# Patient Record
Sex: MALE | Race: ASIAN | NOT HISPANIC OR LATINO | URBAN - METROPOLITAN AREA
[De-identification: names, ages, dates, MRNs, and addresses within clinical notes are randomized per-mention and may not be internally consistent; named-entity substitution may affect disease eponyms.]

---
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
## 2018-02-22 NOTE — ED PROVIDER NOTE - CONSTITUTIONAL, MLM
Detail Level: Zone Detail Level: Generalized normal... Well appearing, well nourished, awake, alert, oriented to person, place, time/situation and in no apparent distress.

## 2022-04-02 ENCOUNTER — INPATIENT (INPATIENT)
Facility: HOSPITAL | Age: 22
LOS: 1 days | Discharge: HOME | DRG: 101 | End: 2022-04-04
Attending: STUDENT IN AN ORGANIZED HEALTH CARE EDUCATION/TRAINING PROGRAM | Admitting: STUDENT IN AN ORGANIZED HEALTH CARE EDUCATION/TRAINING PROGRAM
Payer: COMMERCIAL

## 2022-04-02 VITALS
DIASTOLIC BLOOD PRESSURE: 76 MMHG | HEIGHT: 66 IN | SYSTOLIC BLOOD PRESSURE: 157 MMHG | RESPIRATION RATE: 16 BRPM | OXYGEN SATURATION: 100 % | HEART RATE: 66 BPM | WEIGHT: 200.62 LBS | TEMPERATURE: 98 F

## 2022-04-02 DIAGNOSIS — R56.9 UNSPECIFIED CONVULSIONS: ICD-10-CM

## 2022-04-02 DIAGNOSIS — K29.70 GASTRITIS, UNSPECIFIED, WITHOUT BLEEDING: ICD-10-CM

## 2022-04-02 DIAGNOSIS — Z29.9 ENCOUNTER FOR PROPHYLACTIC MEASURES, UNSPECIFIED: ICD-10-CM

## 2022-04-02 DIAGNOSIS — N17.9 ACUTE KIDNEY FAILURE, UNSPECIFIED: ICD-10-CM

## 2022-04-02 LAB
ALBUMIN SERPL ELPH-MCNC: 4.8 G/DL — SIGNIFICANT CHANGE UP (ref 3.5–5)
ALP SERPL-CCNC: 80 U/L — SIGNIFICANT CHANGE UP (ref 40–120)
ALT FLD-CCNC: 30 U/L DA — SIGNIFICANT CHANGE UP (ref 10–60)
AMPHET UR-MCNC: NEGATIVE — SIGNIFICANT CHANGE UP
ANION GAP SERPL CALC-SCNC: 8 MMOL/L — SIGNIFICANT CHANGE UP (ref 5–17)
APTT BLD: 29.4 SEC — SIGNIFICANT CHANGE UP (ref 27.5–35.5)
AST SERPL-CCNC: 33 U/L — SIGNIFICANT CHANGE UP (ref 10–40)
BARBITURATES UR SCN-MCNC: NEGATIVE — SIGNIFICANT CHANGE UP
BASOPHILS # BLD AUTO: 0.04 K/UL — SIGNIFICANT CHANGE UP (ref 0–0.2)
BASOPHILS NFR BLD AUTO: 0.2 % — SIGNIFICANT CHANGE UP (ref 0–2)
BENZODIAZ UR-MCNC: POSITIVE
BILIRUB SERPL-MCNC: 0.6 MG/DL — SIGNIFICANT CHANGE UP (ref 0.2–1.2)
BUN SERPL-MCNC: 16 MG/DL — SIGNIFICANT CHANGE UP (ref 7–18)
CALCIUM SERPL-MCNC: 9.8 MG/DL — SIGNIFICANT CHANGE UP (ref 8.4–10.5)
CHLORIDE SERPL-SCNC: 106 MMOL/L — SIGNIFICANT CHANGE UP (ref 96–108)
CO2 SERPL-SCNC: 25 MMOL/L — SIGNIFICANT CHANGE UP (ref 22–31)
COCAINE METAB.OTHER UR-MCNC: NEGATIVE — SIGNIFICANT CHANGE UP
CREAT ?TM UR-MCNC: 140 MG/DL — SIGNIFICANT CHANGE UP
CREAT SERPL-MCNC: 1.4 MG/DL — HIGH (ref 0.5–1.3)
EGFR: 72 ML/MIN/1.73M2 — SIGNIFICANT CHANGE UP
EOSINOPHIL # BLD AUTO: 0.01 K/UL — SIGNIFICANT CHANGE UP (ref 0–0.5)
EOSINOPHIL NFR BLD AUTO: 0.1 % — SIGNIFICANT CHANGE UP (ref 0–6)
ETHANOL SERPL-MCNC: <3 MG/DL — SIGNIFICANT CHANGE UP (ref 0–10)
FLUAV AG NPH QL: SIGNIFICANT CHANGE UP
FLUBV AG NPH QL: SIGNIFICANT CHANGE UP
GLUCOSE SERPL-MCNC: 132 MG/DL — HIGH (ref 70–99)
HCT VFR BLD CALC: 41.6 % — SIGNIFICANT CHANGE UP (ref 39–50)
HGB BLD-MCNC: 14.2 G/DL — SIGNIFICANT CHANGE UP (ref 13–17)
IMM GRANULOCYTES NFR BLD AUTO: 0.4 % — SIGNIFICANT CHANGE UP (ref 0–1.5)
INR BLD: 1.01 RATIO — SIGNIFICANT CHANGE UP (ref 0.88–1.16)
LACTATE SERPL-SCNC: 1 MMOL/L — SIGNIFICANT CHANGE UP (ref 0.7–2)
LIDOCAIN IGE QN: 65 U/L — LOW (ref 73–393)
LYMPHOCYTES # BLD AUTO: 1.43 K/UL — SIGNIFICANT CHANGE UP (ref 1–3.3)
LYMPHOCYTES # BLD AUTO: 8.2 % — LOW (ref 13–44)
MCHC RBC-ENTMCNC: 28.7 PG — SIGNIFICANT CHANGE UP (ref 27–34)
MCHC RBC-ENTMCNC: 34.1 GM/DL — SIGNIFICANT CHANGE UP (ref 32–36)
MCV RBC AUTO: 84.2 FL — SIGNIFICANT CHANGE UP (ref 80–100)
METHADONE UR-MCNC: NEGATIVE — SIGNIFICANT CHANGE UP
MONOCYTES # BLD AUTO: 0.75 K/UL — SIGNIFICANT CHANGE UP (ref 0–0.9)
MONOCYTES NFR BLD AUTO: 4.3 % — SIGNIFICANT CHANGE UP (ref 2–14)
NEUTROPHILS # BLD AUTO: 15.15 K/UL — HIGH (ref 1.8–7.4)
NEUTROPHILS NFR BLD AUTO: 86.8 % — HIGH (ref 43–77)
NRBC # BLD: 0 /100 WBCS — SIGNIFICANT CHANGE UP (ref 0–0)
OPIATES UR-MCNC: NEGATIVE — SIGNIFICANT CHANGE UP
OSMOLALITY UR: 515 MOS/KG — SIGNIFICANT CHANGE UP (ref 50–1200)
PCP SPEC-MCNC: SIGNIFICANT CHANGE UP
PCP UR-MCNC: NEGATIVE — SIGNIFICANT CHANGE UP
PLATELET # BLD AUTO: 287 K/UL — SIGNIFICANT CHANGE UP (ref 150–400)
POTASSIUM SERPL-MCNC: 4.4 MMOL/L — SIGNIFICANT CHANGE UP (ref 3.5–5.3)
POTASSIUM SERPL-SCNC: 4.4 MMOL/L — SIGNIFICANT CHANGE UP (ref 3.5–5.3)
PROT SERPL-MCNC: 7.9 G/DL — SIGNIFICANT CHANGE UP (ref 6–8.3)
PROTHROM AB SERPL-ACNC: 12 SEC — SIGNIFICANT CHANGE UP (ref 10.5–13.4)
RBC # BLD: 4.94 M/UL — SIGNIFICANT CHANGE UP (ref 4.2–5.8)
RBC # FLD: 12.4 % — SIGNIFICANT CHANGE UP (ref 10.3–14.5)
SARS-COV-2 RNA SPEC QL NAA+PROBE: SIGNIFICANT CHANGE UP
SARS-COV-2 RNA SPEC QL NAA+PROBE: SIGNIFICANT CHANGE UP
SODIUM SERPL-SCNC: 139 MMOL/L — SIGNIFICANT CHANGE UP (ref 135–145)
SODIUM UR-SCNC: 33 MMOL/L — SIGNIFICANT CHANGE UP
THC UR QL: POSITIVE
WBC # BLD: 17.45 K/UL — HIGH (ref 3.8–10.5)
WBC # FLD AUTO: 17.45 K/UL — HIGH (ref 3.8–10.5)

## 2022-04-02 PROCEDURE — 99223 1ST HOSP IP/OBS HIGH 75: CPT | Mod: GC

## 2022-04-02 PROCEDURE — 99285 EMERGENCY DEPT VISIT HI MDM: CPT

## 2022-04-02 PROCEDURE — 70450 CT HEAD/BRAIN W/O DYE: CPT | Mod: 26,MA

## 2022-04-02 PROCEDURE — 99223 1ST HOSP IP/OBS HIGH 75: CPT

## 2022-04-02 PROCEDURE — 71045 X-RAY EXAM CHEST 1 VIEW: CPT | Mod: 26

## 2022-04-02 RX ORDER — ESCITALOPRAM OXALATE 10 MG/1
10 TABLET, FILM COATED ORAL DAILY
Refills: 0 | Status: DISCONTINUED | OUTPATIENT
Start: 2022-04-02 | End: 2022-04-04

## 2022-04-02 RX ORDER — ENOXAPARIN SODIUM 100 MG/ML
40 INJECTION SUBCUTANEOUS EVERY 24 HOURS
Refills: 0 | Status: DISCONTINUED | OUTPATIENT
Start: 2022-04-02 | End: 2022-04-04

## 2022-04-02 RX ORDER — SODIUM CHLORIDE 9 MG/ML
1000 INJECTION INTRAMUSCULAR; INTRAVENOUS; SUBCUTANEOUS ONCE
Refills: 0 | Status: COMPLETED | OUTPATIENT
Start: 2022-04-02 | End: 2022-04-02

## 2022-04-02 RX ORDER — ONDANSETRON 8 MG/1
4 TABLET, FILM COATED ORAL EVERY 8 HOURS
Refills: 0 | Status: DISCONTINUED | OUTPATIENT
Start: 2022-04-02 | End: 2022-04-04

## 2022-04-02 RX ORDER — ONDANSETRON 8 MG/1
4 TABLET, FILM COATED ORAL ONCE
Refills: 0 | Status: COMPLETED | OUTPATIENT
Start: 2022-04-02 | End: 2022-04-02

## 2022-04-02 RX ORDER — SODIUM CHLORIDE 9 MG/ML
3 INJECTION INTRAMUSCULAR; INTRAVENOUS; SUBCUTANEOUS EVERY 8 HOURS
Refills: 0 | Status: DISCONTINUED | OUTPATIENT
Start: 2022-04-02 | End: 2022-04-04

## 2022-04-02 RX ORDER — LANOLIN ALCOHOL/MO/W.PET/CERES
3 CREAM (GRAM) TOPICAL AT BEDTIME
Refills: 0 | Status: DISCONTINUED | OUTPATIENT
Start: 2022-04-02 | End: 2022-04-04

## 2022-04-02 RX ORDER — ACETAMINOPHEN 500 MG
650 TABLET ORAL EVERY 6 HOURS
Refills: 0 | Status: DISCONTINUED | OUTPATIENT
Start: 2022-04-02 | End: 2022-04-04

## 2022-04-02 RX ORDER — PANTOPRAZOLE SODIUM 20 MG/1
40 TABLET, DELAYED RELEASE ORAL
Refills: 0 | Status: DISCONTINUED | OUTPATIENT
Start: 2022-04-02 | End: 2022-04-03

## 2022-04-02 RX ORDER — LEVETIRACETAM 250 MG/1
500 TABLET, FILM COATED ORAL EVERY 12 HOURS
Refills: 0 | Status: DISCONTINUED | OUTPATIENT
Start: 2022-04-02 | End: 2022-04-04

## 2022-04-02 RX ORDER — ACETAMINOPHEN 500 MG
1000 TABLET ORAL ONCE
Refills: 0 | Status: COMPLETED | OUTPATIENT
Start: 2022-04-02 | End: 2022-04-02

## 2022-04-02 RX ORDER — LEVETIRACETAM 250 MG/1
1000 TABLET, FILM COATED ORAL ONCE
Refills: 0 | Status: COMPLETED | OUTPATIENT
Start: 2022-04-02 | End: 2022-04-02

## 2022-04-02 RX ORDER — SODIUM CHLORIDE 9 MG/ML
1000 INJECTION INTRAMUSCULAR; INTRAVENOUS; SUBCUTANEOUS
Refills: 0 | Status: DISCONTINUED | OUTPATIENT
Start: 2022-04-02 | End: 2022-04-03

## 2022-04-02 RX ORDER — METOCLOPRAMIDE HCL 10 MG
10 TABLET ORAL ONCE
Refills: 0 | Status: COMPLETED | OUTPATIENT
Start: 2022-04-02 | End: 2022-04-02

## 2022-04-02 RX ADMIN — Medication 30 MILLILITER(S): at 12:00

## 2022-04-02 RX ADMIN — ENOXAPARIN SODIUM 40 MILLIGRAM(S): 100 INJECTION SUBCUTANEOUS at 12:00

## 2022-04-02 RX ADMIN — Medication 1000 MILLIGRAM(S): at 01:47

## 2022-04-02 RX ADMIN — LEVETIRACETAM 400 MILLIGRAM(S): 250 TABLET, FILM COATED ORAL at 02:27

## 2022-04-02 RX ADMIN — Medication 10 MILLIGRAM(S): at 02:28

## 2022-04-02 RX ADMIN — SODIUM CHLORIDE 70 MILLILITER(S): 9 INJECTION INTRAMUSCULAR; INTRAVENOUS; SUBCUTANEOUS at 22:56

## 2022-04-02 RX ADMIN — PANTOPRAZOLE SODIUM 40 MILLIGRAM(S): 20 TABLET, DELAYED RELEASE ORAL at 10:09

## 2022-04-02 RX ADMIN — SODIUM CHLORIDE 1000 MILLILITER(S): 9 INJECTION INTRAMUSCULAR; INTRAVENOUS; SUBCUTANEOUS at 01:09

## 2022-04-02 RX ADMIN — Medication 1000 MILLIGRAM(S): at 01:46

## 2022-04-02 RX ADMIN — ONDANSETRON 4 MILLIGRAM(S): 8 TABLET, FILM COATED ORAL at 20:49

## 2022-04-02 RX ADMIN — LEVETIRACETAM 1000 MILLIGRAM(S): 250 TABLET, FILM COATED ORAL at 02:47

## 2022-04-02 RX ADMIN — SODIUM CHLORIDE 1000 MILLILITER(S): 9 INJECTION INTRAMUSCULAR; INTRAVENOUS; SUBCUTANEOUS at 01:47

## 2022-04-02 RX ADMIN — ONDANSETRON 4 MILLIGRAM(S): 8 TABLET, FILM COATED ORAL at 01:09

## 2022-04-02 RX ADMIN — SODIUM CHLORIDE 3 MILLILITER(S): 9 INJECTION INTRAMUSCULAR; INTRAVENOUS; SUBCUTANEOUS at 05:44

## 2022-04-02 RX ADMIN — Medication 400 MILLIGRAM(S): at 01:09

## 2022-04-02 RX ADMIN — SODIUM CHLORIDE 70 MILLILITER(S): 9 INJECTION INTRAMUSCULAR; INTRAVENOUS; SUBCUTANEOUS at 10:09

## 2022-04-02 RX ADMIN — LEVETIRACETAM 420 MILLIGRAM(S): 250 TABLET, FILM COATED ORAL at 19:03

## 2022-04-02 RX ADMIN — SODIUM CHLORIDE 3 MILLILITER(S): 9 INJECTION INTRAMUSCULAR; INTRAVENOUS; SUBCUTANEOUS at 21:51

## 2022-04-02 RX ADMIN — SODIUM CHLORIDE 3 MILLILITER(S): 9 INJECTION INTRAMUSCULAR; INTRAVENOUS; SUBCUTANEOUS at 13:29

## 2022-04-02 RX ADMIN — ONDANSETRON 4 MILLIGRAM(S): 8 TABLET, FILM COATED ORAL at 12:00

## 2022-04-02 RX ADMIN — ESCITALOPRAM OXALATE 10 MILLIGRAM(S): 10 TABLET, FILM COATED ORAL at 19:03

## 2022-04-02 NOTE — H&P ADULT - PROBLEM SELECTOR PLAN 2
- Patient presented with  elevated creatinine level 1.4 , baseline unknown   - IV hydration   - F/U urine electrolytes

## 2022-04-02 NOTE — ED PROVIDER NOTE - CHPI ED SYMPTOMS NEG
no headache, neck stiffness, diarrhea, abdominal pain, chest pain, shortness of breath, focal numbness or weakness/no fever

## 2022-04-02 NOTE — CONSULT NOTE ADULT - SUBJECTIVE AND OBJECTIVE BOX
GLORIA Turner is a 22y old  Male who presents with a chief complaint of     HPI:  22 year old male with PMHx of gastritis and  previous 2 seizure episodes  associated with Benzodiazepine withdrawal approximately 7 years ago presents to the ED at [facility] with nausea and vomiting following  a seizure that happened yesterday witnessed by his brother. 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 He then felt nauseated and fell having convulsions.  He was told by his brother that he saw him having seizure. Mother described the event as generalized shakiness of the body that lasted for 5 min then he lost consciousness so she called the ambulance.  He reports that he bite his tongue and hit his head during the event.  Patient then went to [facility] where labs and CT were performed which was reportedly normal, and was then discharged on oral keppra. Patient states that he has been vomiting all day after coming for [place] and has been unable to take his Keppra, prompting this ER visit.  He currently only feels nauseous and tired with no headache, neck stiffness, diarrhea, abdominal pain, chest pain, shortness of breath, focal numbness or weakness. No difficulty swallowing , visual or hearing impairment .  Patient is active smoker of Marijuana daily about 1 cigarette  , last time was day before yesterday. He denies excessive use of  marijuana or other drug use.  He also states that prior to his seizures several years ago that he also had odd taste in his mouth like pepper twice before having the seizure.     In addition, patient also described problem with taking excessive time leaving the house for preparing for work. He states that he has to check to make sure things are locked or safe several times before leaving the house which can take several hours, He prepares for work hours the night before, laying out his tools. He feels he has to take video of which tools he uses to make sure where they are and feels uncomfortable if he doesn't or leaves his phone. He is constantly worrying and thinking about these things.         MEDICATIONS  (STANDING):  enoxaparin Injectable 40 milliGRAM(s) SubCutaneous every 24 hours  levETIRAcetam  IVPB 500 milliGRAM(s) IV Intermittent every 12 hours  pantoprazole    Tablet 40 milliGRAM(s) Oral before breakfast  sodium chloride 0.9% lock flush 3 milliLiter(s) IV Push every 8 hours  sodium chloride 0.9%. 1000 milliLiter(s) (70 mL/Hr) IV Continuous <Continuous>    MEDICATIONS  (PRN):  acetaminophen     Tablet .. 650 milliGRAM(s) Oral every 6 hours PRN Temp greater or equal to 38C (100.4F), Mild Pain (1 - 3)  aluminum hydroxide/magnesium hydroxide/simethicone Suspension 30 milliLiter(s) Oral every 4 hours PRN Dyspepsia  melatonin 3 milliGRAM(s) Oral at bedtime PRN Insomnia  ondansetron Injectable 4 milliGRAM(s) IV Push every 8 hours PRN Nausea and/or Vomiting    PAST MEDICAL & SURGICAL HISTORY:  No pertinent past medical history    No pertinent past medical history    Seizures    Gastritis    No significant past surgical history    No significant past surgical history        FAMILY HISTORY:  No pertinent neurologic family history in first degree relatives        Social History: as per HPI, single, lives with family, works as     No Known Allergies    Intolerances        REVIEW OF SYSTEMS:    Constitutional: No fever, weight loss, or fatigue  Eyes: No eye pain, visual disturbances, or discharge  ENMT:  No difficulty hearing, tinnitus, vertigo; No sinus or throat pain  Neck: No pain or stiffness  Respiratory: No cough, wheezing, or shortness of breath  Cardiovascular: No chest pain, palpitations, or leg swelling  Gastrointestinal: No abdominal pain, nausea, vomiting, diarrhea or constipation.   Genitourinary: No dysuria, frequency, hematuria, or incontinence  Neurological: As per HPI  Skin: No itching, burning, rashes, or lesions   Endocrine: No heat or cold intolerance; No hair loss  Musculoskeletal: No joint pain or swelling; No muscle, back, or extremity pain  Psychiatric: No depression, anxiety, mood swings, or difficulty sleeping  Heme/Lymph: No easy bruising or bleeding; No enlarged glands      Vital Signs Last 24 Hrs  T(C): 36.9 (02 Apr 2022 11:20), Max: 37.1 (02 Apr 2022 07:27)  T(F): 98.4 (02 Apr 2022 11:20), Max: 98.7 (02 Apr 2022 07:27)  HR: 70 (02 Apr 2022 11:20) (55 - 78)  BP: 143/95 (02 Apr 2022 11:20) (143/95 - 157/76)  BP(mean): --  RR: 17 (02 Apr 2022 11:20) (16 - 17)  SpO2: 99% (02 Apr 2022 11:20) (98% - 100%)    GENERAL EXAM:  Constitutional: awake and alert. NAD  Head: normocephalic atraumatic  Tongue: left lateral laceration  Eyes: nonicteric, clear conjunctiva  Neck: Supple, no masses  Resp: breathing comfortably, normal rate and rhythm  Musculoskeletal: no joint swelling/tenderness, no clubbing or cyanosis, no joint deformity  Skin: left forehead and temple abrasion           Neurological Exam:  Mental Status: Orientated to self, date and place.  Attention intact.  No dysarthria. Speech fluent.  Cranial Nerves:   PERRL, EOMI, VFF, no nystagmus.    CN V1-3 intact to light touch .  No facial asymmetry.  Hearing intact bilaterally.  Tongue  midline.  Sternocleidomastoid and Trapezius intact bilaterally.    Motor:   Tone: normal.                  Strength:     [] Upper extremity                      Delt       Bicep    Tricep                                                  R         5/5        5/5        5/5       5/5                                               L          5/5        5/5        5/5       5/5  [] Lower extremity                       HF          KE          KF        DF         PF                                               R        5/5        5/5        5/5       5/5       5/5                                               L         5/5        5/5       5/5       5/5        5/5             Dysmetria: None to finger-nose-finger or heel-shin-heel  No truncal ataxia.    Tremor: No resting, postural or action tremor.  No myoclonus.    Sensation: intact to light touch, pinprick, vibration and proprioception    Deep Tendon Reflexes:     Biceps          Triceps      BR        Patellar        Ankle         Babinski                                  R       2+                   2+           2+            2+               2+           downgoing                                  L        2+                  2+           2+            2+               2+           downgoing    Gait: normal.      Other:    04-02    139  |  106  |  16  ----------------------------<  132<H>  4.4   |  25  |  1.40<H>    Ca    9.8      02 Apr 2022 01:06    TPro  7.9  /  Alb  4.8  /  TBili  0.6  /  DBili  x   /  AST  33  /  ALT  30  /  AlkPhos  80  04-02                            14.2   17.45 )-----------( 287      ( 02 Apr 2022 01:06 )             41.6       Radiology    CT:   MRI  EKG:  tele:  TTE:  EEG:

## 2022-04-02 NOTE — ED PROVIDER NOTE - OBJECTIVE STATEMENT
22 year old male with PMHx of gastritis and a previous seizure associated with Benzodiazepine withdrawal approximately seven years ago and no other seizure history presents to the ED with complaints of a seizure this morning and persistent vomiting. Patient was with his brother at 11:00 this morning when he began having a strange sensation fo smell, after which he was witnessed to have bilateral convulsions lasting for approximately ten minutes following a postictal state. Patient then went to [facility] where labs and CT were performed (no results available from this visit), and was then discharged. Patient is currently on Keppra. Patient states that he has been vomiting all day and has been unable to take his Keppra, prompting this ER visit. Patient reports that prior the seizure he felt fine, and denies any fevers or infectious symptoms. Patient states that he currently only feels nauseous and tired with no headache, neck stiffness, diarrhea, abdominal pain, chest pain, shortness of breath, focal numbness or weakness. Patient denies any recent illness or hospitalizations. NKDA.

## 2022-04-02 NOTE — ED PROVIDER NOTE - CLINICAL SUMMARY MEDICAL DECISION MAKING FREE TEXT BOX
Patient presenting with seizure and persistent vomiting who is unable to take his Keppra as prescribed. Reassuring neurological exam, however no explanation for today's seizure, therefore will approach as a first time seizure. Labs showing white count 17k, creatinine 1.4, otherwise unremarkable. CT head pending, plan to admit. Patient stable, will reassess Patient presenting with seizure and persistent vomiting who is unable to take his Keppra as prescribed. Reassuring neurological exam, however no explanation for today's seizure, therefore will approach as a first time seizure. Labs showing white count 17k, creatinine 1.4, otherwise unremarkable. CT head pending, plan to admit. Patient stable, will reassess.    CT head showing no acute path. On reassessment pt is persistently nauseous with no new symptoms. Admitting for concern for first time primary seizure. Pt stable and endorsed to inpatient team.

## 2022-04-02 NOTE — H&P ADULT - ATTENDING COMMENTS
Patient was seen and examined with mother and wife at bedside in ER holding area. patient reports he did not have any seizure episode since discharged from [facility] yesterday but is having persistent N/V. Although feeling better this early morning he started having N/V from afternoon again. Wife reports he had thrown up 5 times since morning. His last seizure episode was in 2015, have not been on any medication for seizure. Previously the seizure episodes were attributed to benzo withdrawal. Patient denies any use of benzodiazepines. Only admits to use marijuana. Denies any head trauma.    Vitals noted     P/E:  GENERAL: NAD, well-groomed, well-developed, mild dehydration  HEAD:  Atraumatic, Normocephalic, mild forehead abrasion  NERVOUS SYSTEM:  Alert & Oriented X3, Good concentration; Motor Strength 5/5 B/L upper and lower extremities  CHEST/LUNG: Clear to auscultation bilaterally; No rales, rhonchi, wheezing, or rubs  HEART: Regular rate and rhythm; No murmurs, rubs, or gallops  ABDOMEN: Soft, Nontender, Nondistended; Bowel sounds present  EXTREMITIES:  2+ Peripheral Pulses, No clubbing, cyanosis, or edema  SKIN: No rashes or lesions    labs noted     A/P:  Seizure   Cannabis hyperemesis syndrome   LUCIANO  Leukocytosis     Plan:  Appreciate neurology consult, will obtain MRI brain with EEG  Will cont Keppra 500mg BID  Zofran for N/V, PPI  Cont IVF  Monitor renal function  Monitor electrolytes closely  No sign of infection, will monitor wbc now, possibly reactive   Seizure and fall precaution  Plan of care was discussed with patient and primary care giver mother and wife; all questions and concerns were addressed and care was aligned with patient's wishes.

## 2022-04-02 NOTE — H&P ADULT - PROBLEM SELECTOR PLAN 1
- Patient PW seizure episode witnessed by brother   - CT head negative   - UTOX positive for benzo and THC   - s/p keppra 1000 IV in ED , started on keppra 500 BID IV for now   - neurology consulted Dr Gordon  - seizure precautions .

## 2022-04-02 NOTE — PATIENT PROFILE ADULT - FALL HARM RISK - UNIVERSAL INTERVENTIONS
Bed in lowest position, wheels locked, appropriate side rails in place/Call bell, personal items and telephone in reach/Instruct patient to call for assistance before getting out of bed or chair/Non-slip footwear when patient is out of bed/[place] to call system/Physically safe environment - no spills, clutter or unnecessary equipment/Purposeful Proactive Rounding/Room/bathroom lighting operational, light cord in reach

## 2022-04-02 NOTE — ED PROVIDER NOTE - NSICDXPASTMEDICALHX_GEN_ALL_CORE_FT
PAST MEDICAL HISTORY:  No pertinent past medical history     No pertinent past medical history       Gastritis     Seizures

## 2022-04-02 NOTE — H&P ADULT - PROBLEM SELECTOR PLAN 3
- Patient has history of gastritis PW nausea and vomiting   - started on PPI oral - Patient has history of gastritis PW nausea and vomiting   - started on PPI oral, zofran PRN for nausea

## 2022-04-02 NOTE — H&P ADULT - NSHPPHYSICALEXAM_GEN_ALL_CORE
ICU Vital Signs Last 24 Hrs  T(C): 37.1 (02 Apr 2022 07:27), Max: 37.1 (02 Apr 2022 07:27)  T(F): 98.7 (02 Apr 2022 07:27), Max: 98.7 (02 Apr 2022 07:27)  HR: 55 (02 Apr 2022 07:27) (55 - 78)  BP: 144/73 (02 Apr 2022 07:27) (144/73 - 157/76)  BP(mean): --  ABP: --  ABP(mean): --  RR: 16 (02 Apr 2022 07:27) (16 - 16)  SpO2: 99% (02 Apr 2022 07:27) (98% - 100%)  GENERAL: NAD  HEAD:   Normocephalic , mild abrasion on lateral forehead  EYES: EOMI, PERRLA, conjunctiva and sclera clear  NECK: Supple, No JVD  CHEST/LUNG: Clear to auscultation bilaterally; No wheeze  HEART: Regular rate and rhythm; No murmurs, rubs, or gallops  ABDOMEN: Soft, Nontender, Nondistended; Bowel sounds present  EXTREMITIES:  2+ Peripheral Pulses, No clubbing, cyanosis, or edema  PSYCH: AAOx3  NEUROLOGY: cranial nerves 1-12 intact , motor strengh 5/5 all 4 extremities , sensation intact   SKIN: mild abrasion on lateral forehead

## 2022-04-02 NOTE — ED PROVIDER NOTE - PHYSICAL EXAMINATION
Vital Signs Reviewed  GEN: Comfortable, NAD  HEENT: NCAT, MMM, Neck Supple  RESP: CTAB, No rales/rhonchi/wheezing  CV: RRR, S1S2, No murmurs  ABD: No TTP, ND, No masses  Extrem/Skin: Equal pulses bilat, No cyanosis/edema/rashes  Neuro: AAOx3, CNs Grossly Intact, Nml coordinating mvmts, Equal strength/sensation in all extremities bilat, No Pronator Drift, Nml Gait

## 2022-04-02 NOTE — H&P ADULT - HISTORY OF PRESENT ILLNESS
22 year old male with PMHx of gastritis and  previous 2 seizure episodes  associated with Benzodiazepine withdrawal approximately seven years ago and no other seizure history presents to the ED with  a seizure that happened yesterday witnessed by his brother . Patient reports that he was fixing his card in his backyard yesterday and then he started feeling nauseated with weird taste in his tongue then he doesnot remember what happened , He woke up in the ambulance after that . He was told by his brother that he saw him having seizure ( cannot describe the event )  ,called mother at [phone] ( wrong phone no in the chart ) and she described the event as generalized shakiness of the body that lasted for 5 min then he lost conciousness so she called the ambulance ,  Patient then went to [facility] where labs and CT were performed (no results available from this visit), and was then discharged on oral keppra . As per mother , Patient  that he has been vomiting all day after coming for [facility] and has been unable to take his Keppra, prompting this ER visit. Patient reports that prior the seizure he felt totally  fine until he felt this taste in his mouth , He reports that he bite his tongue and hit his head during the event.  he currently only feels nauseous and tired with no headache, neck stiffness, diarrhea, abdominal pain, chest pain, shortness of breath, focal numbness or weakness. No difficulty swallowing , visual or hearing impairment .  Patient is active smoker of MariJuana daily , last time was was day before yesterday.

## 2022-04-02 NOTE — ED PROVIDER NOTE - SEIZURE DURATION
4/21/2021         RE: Mary Jo Wooten  [address][birth year] [address] [address]        Dear Colleague,    Thank you for referring your patient, Mary Jo Wooten, to the [facility] [place]. Please see a copy of my visit note below.    CHIEF COMPLAINT:  Pain of the Left Ankle     HISTORY OF PRESENT ILLNESS  Ms. Wooten is a pleasant 10 year old year old female who presents to clinic today with her mother.  Mary Jo explains that she injured her left ankle about 3 years ago. She states someone accidentally stepped on her ankle.     Onset: sudden, now pain is intermittently depending on activity such as jumping and with increased dorislfexion or plantarflexion  Location: left lateral ankle  Quality:  aching and dull pain with walking  Duration: 3 years   Severity: 4/10 at worst  Timing:intermittent episodes   Modifying factors:  resting and non-use makes it better, movement and use makes it worse  Associated signs & symptoms: denies clicking, popping, or swelling  Previous similar pain: No  Treatments to date: topical pain relieving gel, compression wrap.     Additional history: as documented    Review of Systems:    Have you recently had a a fever, chills, weight loss? No    Do you have any vision problems? No    Do you have any chest pain or edema? No    Do you have any shortness of breath or wheezing?  No    Do you have stomach problems? No    Do you have any numbness or focal weakness? No    Do you have diabetes? No    Do you have problems with bleeding or clotting? No    Do you have an rashes or other skin lesions? No    MEDICAL HISTORY  Patient Active Problem List   Diagnosis     Epistaxis     Encounter for routine child health examination without abnormal findings       Current Outpatient Medications   Medication Sig Dispense Refill     loratadine (CLARITIN) 5 MG/5ML syrup Take 10 mLs (10 mg) by mouth daily 300 mL 4     Pediatric Multivit-Minerals-C (CHILDRENS GUMMIES PO)  10 minutes/minute(s) Take by mouth daily         Allergies   Allergen Reactions     Seasonal Allergies        Family History   Problem Relation Age of Onset     Hypertension Father      Kidney Disease Father         after 3 years of dialysis     Hypertension Paternal Grandmother      Attention Deficit Disorder Sister        Additional medical/Social/Surgical histories reviewed in Psychiatric and updated as appropriate.       PHYSICAL EXAM  There were no vitals taken for this visit.    General  - normal appearance, in no obvious distress  Musculoskeletal - ankle left  - stance: normal gait without limp, normal single leg squat, no obvious leg length discrepancy, normal heel and toe walk  - inspection: no swelling or effusion,  normal bone and joint alignment, no obvious deformity  - palpation: no soft tissue tenderness, malleoli and tarsal bones non-tender  - ROM: normal dorsiflexion, plantar flexion, inversion, eversion, not painful  - strength: 5/5 in all planes  - special tests:  (-) anterior drawer  (-) talar tilt  (-) Tinel's  (-) squeeze test  (-) Flynn test  Neuro  - no sensory or motor deficit, grossly normal coordination, normal muscle tone  Skin  - no ecchymosis, erythema, warmth, or induration, no obvious rash  Psych  - interactive, appropriate, normal mood and affect    IMAGING : XR knee left 3V. Final results and radiologist's interpretation, available in the [facility] health record. Images were reviewed with the patient/family members in the office today. My personal interpretation of the performed imaging is     ASSESSMENT & PLAN  Ms. Wooten is a 10 year old year old female who presents to clinic today with history of ankle sprain 2 years ago recurrent ankle pain.  Suspected ankle instability relating to previous injuries.    Diagnosis: Acute ankle pain of left ankle    Radiographs reviewed with mom and patient and unremarkable today.  Clinical examination is also.  Suspect there is some degree of instability relating to her  previous ankle injuries.  This is also predisposing her to recurrent sprain mechanism.  I would like her to start also exercise program.  [facility] Prieto provided.  May consider formal physical therapy additional assistance as needed.  Discussed proper footwear.  Follow-up as needed or if ankle pain recurs.    It was a pleasure seeing Mary Jo today.    Jean Allan DO, [facility]  Primary Care Sports Medicine        Again, thank you for allowing me to participate in the care of your patient.        Sincerely,        Jean Allan DO

## 2022-04-02 NOTE — H&P ADULT - NSICDXPASTMEDICALHX_GEN_ALL_CORE_FT
PAST MEDICAL HISTORY:  Gastritis     No pertinent past medical history     No pertinent past medical history     Seizures

## 2022-04-02 NOTE — CONSULT NOTE ADULT - ASSESSMENT
21 y/o man with hx of seizure 7 years ago thought to be in the setting of benzodiazepine withdrawal, marijuana use, p/w seizure. Pt describes proceeding aura of foul smell and taste with nausea. CTH negative. Suspect underlying seizure disorder, possibly temporal lobe epilepsy.   Also pt having obsessive compulsions and worrying mostly about his work and rituals leaving the house which take several hours. Most likely Obsessive compulsive disorder.      Seizure  - MRI brain  epilepsy protocol w/wo  - VEEG   - Continue Keppra 500mg BID  - seizure precautions  - f/u Dr. Carlos Krause neurology    OCD  - Consider starting Lexapro 10mg  - Follow up with [facility]  OCD [facility]- Enroll as a new patient email  [email or web address], or call [phone]

## 2022-04-02 NOTE — H&P ADULT - ASSESSMENT
22 year old male with PMHx of gastritis and  previous 2 seizure episodes  associated with Benzodiazepine withdrawal approximately seven years ago and no other seizure history presents to the ED with  a seizure that happened yesterday witnessed by his

## 2022-04-03 LAB
ALBUMIN SERPL ELPH-MCNC: 4.1 G/DL — SIGNIFICANT CHANGE UP (ref 3.5–5)
ALP SERPL-CCNC: 72 U/L — SIGNIFICANT CHANGE UP (ref 40–120)
ALT FLD-CCNC: 28 U/L DA — SIGNIFICANT CHANGE UP (ref 10–60)
ANION GAP SERPL CALC-SCNC: 7 MMOL/L — SIGNIFICANT CHANGE UP (ref 5–17)
AST SERPL-CCNC: 29 U/L — SIGNIFICANT CHANGE UP (ref 10–40)
BASOPHILS # BLD AUTO: 0.06 K/UL — SIGNIFICANT CHANGE UP (ref 0–0.2)
BASOPHILS NFR BLD AUTO: 0.3 % — SIGNIFICANT CHANGE UP (ref 0–2)
BILIRUB SERPL-MCNC: 0.8 MG/DL — SIGNIFICANT CHANGE UP (ref 0.2–1.2)
BUN SERPL-MCNC: 16 MG/DL — SIGNIFICANT CHANGE UP (ref 7–18)
CALCIUM SERPL-MCNC: 9.9 MG/DL — SIGNIFICANT CHANGE UP (ref 8.4–10.5)
CHLORIDE SERPL-SCNC: 102 MMOL/L — SIGNIFICANT CHANGE UP (ref 96–108)
CO2 SERPL-SCNC: 28 MMOL/L — SIGNIFICANT CHANGE UP (ref 22–31)
CREAT SERPL-MCNC: 1.45 MG/DL — HIGH (ref 0.5–1.3)
EGFR: 69 ML/MIN/1.73M2 — SIGNIFICANT CHANGE UP
EOSINOPHIL # BLD AUTO: 0.02 K/UL — SIGNIFICANT CHANGE UP (ref 0–0.5)
EOSINOPHIL NFR BLD AUTO: 0.1 % — SIGNIFICANT CHANGE UP (ref 0–6)
GLUCOSE SERPL-MCNC: 113 MG/DL — HIGH (ref 70–99)
HCT VFR BLD CALC: 42.8 % — SIGNIFICANT CHANGE UP (ref 39–50)
HGB BLD-MCNC: 14.5 G/DL — SIGNIFICANT CHANGE UP (ref 13–17)
IMM GRANULOCYTES NFR BLD AUTO: 0.5 % — SIGNIFICANT CHANGE UP (ref 0–1.5)
LYMPHOCYTES # BLD AUTO: 13.8 % — SIGNIFICANT CHANGE UP (ref 13–44)
LYMPHOCYTES # BLD AUTO: 2.66 K/UL — SIGNIFICANT CHANGE UP (ref 1–3.3)
MAGNESIUM SERPL-MCNC: 2.2 MG/DL — SIGNIFICANT CHANGE UP (ref 1.6–2.6)
MCHC RBC-ENTMCNC: 29 PG — SIGNIFICANT CHANGE UP (ref 27–34)
MCHC RBC-ENTMCNC: 33.9 GM/DL — SIGNIFICANT CHANGE UP (ref 32–36)
MCV RBC AUTO: 85.6 FL — SIGNIFICANT CHANGE UP (ref 80–100)
MONOCYTES # BLD AUTO: 1.32 K/UL — HIGH (ref 0–0.9)
MONOCYTES NFR BLD AUTO: 6.8 % — SIGNIFICANT CHANGE UP (ref 2–14)
NEUTROPHILS # BLD AUTO: 15.14 K/UL — HIGH (ref 1.8–7.4)
NEUTROPHILS NFR BLD AUTO: 78.5 % — HIGH (ref 43–77)
NRBC # BLD: 0 /100 WBCS — SIGNIFICANT CHANGE UP (ref 0–0)
PHOSPHATE SERPL-MCNC: 2.6 MG/DL — SIGNIFICANT CHANGE UP (ref 2.5–4.5)
PLATELET # BLD AUTO: 306 K/UL — SIGNIFICANT CHANGE UP (ref 150–400)
POTASSIUM SERPL-MCNC: 4.1 MMOL/L — SIGNIFICANT CHANGE UP (ref 3.5–5.3)
POTASSIUM SERPL-SCNC: 4.1 MMOL/L — SIGNIFICANT CHANGE UP (ref 3.5–5.3)
PROT SERPL-MCNC: 7.7 G/DL — SIGNIFICANT CHANGE UP (ref 6–8.3)
RBC # BLD: 5 M/UL — SIGNIFICANT CHANGE UP (ref 4.2–5.8)
RBC # FLD: 12.8 % — SIGNIFICANT CHANGE UP (ref 10.3–14.5)
SODIUM SERPL-SCNC: 137 MMOL/L — SIGNIFICANT CHANGE UP (ref 135–145)
WBC # BLD: 19.29 K/UL — HIGH (ref 3.8–10.5)
WBC # FLD AUTO: 19.29 K/UL — HIGH (ref 3.8–10.5)

## 2022-04-03 PROCEDURE — 99233 SBSQ HOSP IP/OBS HIGH 50: CPT

## 2022-04-03 PROCEDURE — 70553 MRI BRAIN STEM W/O & W/DYE: CPT | Mod: 26

## 2022-04-03 PROCEDURE — 95819 EEG AWAKE AND ASLEEP: CPT | Mod: 26

## 2022-04-03 PROCEDURE — 76377 3D RENDER W/INTRP POSTPROCES: CPT | Mod: 26

## 2022-04-03 RX ORDER — SODIUM CHLORIDE 9 MG/ML
1000 INJECTION INTRAMUSCULAR; INTRAVENOUS; SUBCUTANEOUS
Refills: 0 | Status: DISCONTINUED | OUTPATIENT
Start: 2022-04-03 | End: 2022-04-04

## 2022-04-03 RX ORDER — METOCLOPRAMIDE HCL 10 MG
10 TABLET ORAL ONCE
Refills: 0 | Status: COMPLETED | OUTPATIENT
Start: 2022-04-03 | End: 2022-04-03

## 2022-04-03 RX ORDER — PANTOPRAZOLE SODIUM 20 MG/1
40 TABLET, DELAYED RELEASE ORAL
Refills: 0 | Status: DISCONTINUED | OUTPATIENT
Start: 2022-04-03 | End: 2022-04-04

## 2022-04-03 RX ADMIN — Medication 10 MILLIGRAM(S): at 12:14

## 2022-04-03 RX ADMIN — ESCITALOPRAM OXALATE 10 MILLIGRAM(S): 10 TABLET, FILM COATED ORAL at 12:15

## 2022-04-03 RX ADMIN — PANTOPRAZOLE SODIUM 40 MILLIGRAM(S): 20 TABLET, DELAYED RELEASE ORAL at 05:37

## 2022-04-03 RX ADMIN — SODIUM CHLORIDE 3 MILLILITER(S): 9 INJECTION INTRAMUSCULAR; INTRAVENOUS; SUBCUTANEOUS at 05:35

## 2022-04-03 RX ADMIN — LEVETIRACETAM 420 MILLIGRAM(S): 250 TABLET, FILM COATED ORAL at 18:08

## 2022-04-03 RX ADMIN — PANTOPRAZOLE SODIUM 40 MILLIGRAM(S): 20 TABLET, DELAYED RELEASE ORAL at 18:10

## 2022-04-03 RX ADMIN — SODIUM CHLORIDE 3 MILLILITER(S): 9 INJECTION INTRAMUSCULAR; INTRAVENOUS; SUBCUTANEOUS at 21:43

## 2022-04-03 RX ADMIN — ENOXAPARIN SODIUM 40 MILLIGRAM(S): 100 INJECTION SUBCUTANEOUS at 12:15

## 2022-04-03 RX ADMIN — ONDANSETRON 4 MILLIGRAM(S): 8 TABLET, FILM COATED ORAL at 08:15

## 2022-04-03 RX ADMIN — LEVETIRACETAM 420 MILLIGRAM(S): 250 TABLET, FILM COATED ORAL at 05:37

## 2022-04-03 NOTE — PROGRESS NOTE ADULT - ASSESSMENT
Seizure   Cannabis hyperemesis syndrome   LUCIANO  Leukocytosis     Plan:  EEG noted  Pending MRI  Will cont Keppra 500mg BID  Change Zofran, PPI  Increase IVF  Monitor renal function  Monitor electrolytes closely  No sign of infection, will send blood culture   Seizure and fall precaution     Seizure   Cannabis hyperemesis syndrome   LUCIANO  Leukocytosis     Plan:  EEG noted  Pending MRI  Will cont Keppra 500mg BID  Change Zofran, IV PPI BID  Increase IVF  Monitor renal function  Monitor electrolytes closely  No sign of infection, will send blood culture   Seizure and fall precaution  Plan of care was discussed with patient; all questions and concerns were addressed and care was aligned with patient's wishes.

## 2022-04-03 NOTE — EEG REPORT - NS EEG TEXT BOX
[facility]   COMPREHENSIVE EPILEPSY CENTER   REPORT OF ROUTINE VIDEO EEG     [facility]: [address], [place] [address], Ph#: [phone]  [facility]: [address], [place] [address], Ph#: [phone]  [facility]: [address] [place] [address], Ph#: [phone]    Patient Name: GLORIA MARIE  Age and : 22y ([birth year])  MRN #: [id number]  Location: [address]  Referring Physician: Cyrus Duran    Study Date: 22    _____________________________________________________________  TECHNICAL INFORMATION    Placement and Labeling of Electrodes:  The EEG was performed utilizing 20 channels referential EEG connections (coronal over temporal over parasagittal montage) using all standard 10-20 electrode placements with EKG.  Recording was at a sampling rate of 256 samples per second per channel.  Time synchronized digital video recording was done simultaneously with EEG recording.  A low light infrared camera was used for low light recording.  Thaddeus and seizure detection algorithms were utilized.    _____________________________________________________________  HISTORY    Patient is a 22y old  Male who presents with a chief complaint of     PERTINENT MEDICATION:  levETIRAcetam  IVPB 500 milliGRAM(s) IV Intermittent every 12 hours    _____________________________________________________________  STUDY INTERPRETATION    Findings: The background was continuous and reactive. During wakefulness, the posterior dominant rhythm consisted of symmetric, well-modulated 8.5 Hz activity, with amplitude to 30 uV, that attenuated to eye opening.      Background Slowing:  No generalized background slowing was present.    Focal Slowing:   None were present.    Sleep Background:  Drowsiness was characterized by fragmentation, attenuation, and slowing of the background activity.    Stage II sleep transients were recorded.    Other Non-Epileptiform Findings:  None were present.    Interictal Epileptiform Activity:   None were present.    Events:  Clinical events: None recorded.  Seizures: None recorded.    Activation Procedures:   Hyperventilation was not performed.    Photic stimulation was not performed.     Artifacts:  Intermittent myogenic and movement artifacts were noted.    ECG:  The heart rate on single channel ECG was predominantly between 70-80 BPM.    _____________________________________________________________  EEG SUMMARY/CLASSIFICATION    Normal EEG in the awake, drowsy and asleep states.    _____________________________________________________________  EEG IMPRESSION/CLINICAL CORRELATE    Normal EEG study.  No epileptiform patterns or seizures were recorded.    *** PRELIMINARY REPORT - PENDING EPILEPSY ATTENDING REVIEW ***  _____________________________________________________________    Hernandez Akins MD, PAM  Fellow | [facility]   [facility]   COMPREHENSIVE EPILEPSY CENTER   REPORT OF ROUTINE VIDEO EEG     [facility]: [address], [place] [address], Ph#: [phone]  [facility]: [address], [place] [address], Ph#: [phone]  [facility]: [address] [place] [address], Ph#: [phone]    Patient Name: GLORIA MARIE  Age and : 22y ([birth year])  MRN #: [id number]  Location: [address]  Referring Physician: Cyrus Duran    Study Date: 22    _____________________________________________________________  TECHNICAL INFORMATION    Placement and Labeling of Electrodes:  The EEG was performed utilizing 20 channels referential EEG connections (coronal over temporal over parasagittal montage) using all standard 10-20 electrode placements with EKG.  Recording was at a sampling rate of 256 samples per second per channel.  Time synchronized digital video recording was done simultaneously with EEG recording.  A low light infrared camera was used for low light recording.  Thaddeus and seizure detection algorithms were utilized.    _____________________________________________________________  HISTORY    Patient is a 22y old  Male who presents with a chief complaint of     PERTINENT MEDICATION:  levETIRAcetam  IVPB 500 milliGRAM(s) IV Intermittent every 12 hours    _____________________________________________________________  STUDY INTERPRETATION    Findings: The background was continuous and reactive. During wakefulness, the posterior dominant rhythm consisted of symmetric, well-modulated 8.5 Hz activity, with amplitude to 30 uV, that attenuated to eye opening.      Background Slowing:  No generalized background slowing was present.    Focal Slowing:   None were present.    Sleep Background:  Drowsiness was characterized by fragmentation, attenuation, and slowing of the background activity.    Stage II sleep transients were recorded.    Other Non-Epileptiform Findings:  None were present.    Interictal Epileptiform Activity:   None were present.    Events:  Clinical events: None recorded.  Seizures: None recorded.    Activation Procedures:   Hyperventilation was not performed.    Photic stimulation was performed and no anomalies elicited.     Artifacts:  Intermittent myogenic and movement artifacts were noted.    ECG:  The heart rate on single channel ECG was predominantly between 70-80 BPM.    _____________________________________________________________  EEG SUMMARY/CLASSIFICATION    Normal EEG in the awake, drowsy and asleep states.    _____________________________________________________________  EEG IMPRESSION/CLINICAL CORRELATE    Normal EEG study.  No epileptiform patterns or seizures were recorded.    *** PRELIMINARY REPORT - PENDING EPILEPSY ATTENDING REVIEW ***  _____________________________________________________________    Hernandez Akins MD, PAM  Fellow | [facility]   [facility]   COMPREHENSIVE EPILEPSY CENTER   REPORT OF ROUTINE VIDEO EEG     [facility]: [address], [place] [address], Ph#: [phone]  [facility]: [address], [place] [address], Ph#: [phone]  [facility]: [address] [place] [address], Ph#: [phone]    Patient Name: GLORIA MARIE  Age and : 22y ([birth year])  MRN #: [id number]  Location: [address]  Referring Physician: Cyrus Duran    Study Date: 22    _____________________________________________________________  TECHNICAL INFORMATION    Placement and Labeling of Electrodes:  The EEG was performed utilizing 20 channels referential EEG connections (coronal over temporal over parasagittal montage) using all standard 10-20 electrode placements with EKG.  Recording was at a sampling rate of 256 samples per second per channel.  Time synchronized digital video recording was done simultaneously with EEG recording.  A low light infrared camera was used for low light recording.  Thaddeus and seizure detection algorithms were utilized.    _____________________________________________________________  HISTORY    Patient is a 22y old  Male who presents with a chief complaint of     PERTINENT MEDICATION:  levETIRAcetam  IVPB 500 milliGRAM(s) IV Intermittent every 12 hours    _____________________________________________________________  STUDY INTERPRETATION    Findings: The background was continuous and reactive. During wakefulness, the posterior dominant rhythm consisted of symmetric, well-modulated 8.5 Hz activity, with amplitude to 30 uV, that attenuated to eye opening.      Background Slowing:  No generalized background slowing was present.    Focal Slowing:   None were present.    Sleep Background:  Drowsiness was characterized by fragmentation, attenuation, and slowing of the background activity.    Stage II sleep transients were recorded.    Other Non-Epileptiform Findings:  None were present.    Interictal Epileptiform Activity:   None were present.    Events:  Clinical events: None recorded.  Seizures: None recorded.    Activation Procedures:   Hyperventilation was not performed.    Photic stimulation was performed and no anomalies elicited.     Artifacts:  Intermittent myogenic and movement artifacts were noted.    ECG:  The heart rate on single channel ECG was predominantly between 70-80 BPM.    _____________________________________________________________  EEG SUMMARY/CLASSIFICATION    Normal EEG in the awake, drowsy and asleep states.    _____________________________________________________________  EEG IMPRESSION/CLINICAL CORRELATE    Normal EEG study.  No epileptiform patterns or seizures were recorded.    _____________________________________________________________    Hernandez Akins MD, PAM  Fellow | [facility]    Alex Oliver MD PhD  Director, Epilepsy Division, [facility] EEG Reading Room Ph#: [phone]  Epilepsy Answering Service after 5PM and before 8:30AM: Ph#: [phone]

## 2022-04-03 NOTE — PROGRESS NOTE ADULT - SUBJECTIVE AND OBJECTIVE BOX
Patient is a 22y old  Male who presents with a chief complaint of nausea and vomiting     Patient was seen and examined at bedside   Denies any N/V but later started having Nausea again  No more seizure episode    INTERVAL HPI/OVERNIGHT EVENTS:  T(C): 36.4 (04-03-22 @ 04:53), Max: 36.8 (04-02-22 @ 20:13)  HR: 68 (04-03-22 @ 04:53) (67 - 73)  BP: 137/90 (04-03-22 @ 04:53) (114/64 - 137/90)  RR: 16 (04-03-22 @ 04:53) (16 - 17)  SpO2: 100% (04-03-22 @ 04:53) (98% - 100%)  Wt(kg): --  I&O's Summary      REVIEW OF SYSTEMS: denies fever, chills, SOB, palpitations, chest pain, abdominal pain    MEDICATIONS  (STANDING):  enoxaparin Injectable 40 milliGRAM(s) SubCutaneous every 24 hours  escitalopram 10 milliGRAM(s) Oral daily  levETIRAcetam  IVPB 500 milliGRAM(s) IV Intermittent every 12 hours  pantoprazole    Tablet 40 milliGRAM(s) Oral before breakfast  sodium chloride 0.9% lock flush 3 milliLiter(s) IV Push every 8 hours  sodium chloride 0.9%. 1000 milliLiter(s) (150 mL/Hr) IV Continuous <Continuous>    MEDICATIONS  (PRN):  acetaminophen     Tablet .. 650 milliGRAM(s) Oral every 6 hours PRN Temp greater or equal to 38C (100.4F), Mild Pain (1 - 3)  aluminum hydroxide/magnesium hydroxide/simethicone Suspension 30 milliLiter(s) Oral every 4 hours PRN Dyspepsia  melatonin 3 milliGRAM(s) Oral at bedtime PRN Insomnia  ondansetron Injectable 4 milliGRAM(s) IV Push every 8 hours PRN Nausea and/or Vomiting      PHYSICAL EXAM:  GENERAL: NAD, well-groomed, well-developed  HEAD:  Atraumatic, Normocephalic  NERVOUS SYSTEM:  Alert & Oriented X3, Good concentration; Motor Strength 5/5 B/L upper and lower extremities  CHEST/LUNG: Clear to auscultation bilaterally; No rales, rhonchi, wheezing, or rubs  HEART: Regular rate and rhythm; No murmurs, rubs, or gallops  ABDOMEN: Soft, Nontender, Nondistended; Bowel sounds present  EXTREMITIES:  2+ Peripheral Pulses, No clubbing, cyanosis, or edema  SKIN: No rashes or lesions    LABS:                        14.5   19.29 )-----------( 306      ( 03 Apr 2022 06:58 )             42.8     04-03    137  |  102  |  16  ----------------------------<  113<H>  4.1   |  28  |  1.45<H>    Ca    9.9      03 Apr 2022 06:58  Phos  2.6     04-03  Mg     2.2     04-03    TPro  7.7  /  Alb  4.1  /  TBili  0.8  /  DBili  x   /  AST  29  /  ALT  28  /  AlkPhos  72  04-03    PT/INR - ( 02 Apr 2022 01:06 )   PT: 12.0 sec;   INR: 1.01 ratio         PTT - ( 02 Apr 2022 01:06 )  PTT:29.4 sec    CAPILLARY BLOOD GLUCOSE

## 2022-04-03 NOTE — CHART NOTE - NSCHARTNOTEFT_GEN_A_CORE
Patient c/o vomiting X 3 this am . Zofran given with minimal response. IVF will start on NS at 150 cc/hr and will give a stat dose of Reglan . Will reassess

## 2022-04-04 ENCOUNTER — TRANSCRIPTION ENCOUNTER (OUTPATIENT)
Age: 22
End: 2022-04-04

## 2022-04-04 VITALS — SYSTOLIC BLOOD PRESSURE: 132 MMHG | DIASTOLIC BLOOD PRESSURE: 71 MMHG | HEART RATE: 58 BPM | OXYGEN SATURATION: 98 %

## 2022-04-04 LAB
ALBUMIN SERPL ELPH-MCNC: 4.1 G/DL — SIGNIFICANT CHANGE UP (ref 3.5–5)
ALP SERPL-CCNC: 68 U/L — SIGNIFICANT CHANGE UP (ref 40–120)
ALT FLD-CCNC: 26 U/L DA — SIGNIFICANT CHANGE UP (ref 10–60)
ANION GAP SERPL CALC-SCNC: 7 MMOL/L — SIGNIFICANT CHANGE UP (ref 5–17)
AST SERPL-CCNC: 26 U/L — SIGNIFICANT CHANGE UP (ref 10–40)
BASOPHILS # BLD AUTO: 0.04 K/UL — SIGNIFICANT CHANGE UP (ref 0–0.2)
BASOPHILS NFR BLD AUTO: 0.3 % — SIGNIFICANT CHANGE UP (ref 0–2)
BILIRUB SERPL-MCNC: 0.5 MG/DL — SIGNIFICANT CHANGE UP (ref 0.2–1.2)
BUN SERPL-MCNC: 10 MG/DL — SIGNIFICANT CHANGE UP (ref 7–18)
CALCIUM SERPL-MCNC: 9.4 MG/DL — SIGNIFICANT CHANGE UP (ref 8.4–10.5)
CHLORIDE SERPL-SCNC: 104 MMOL/L — SIGNIFICANT CHANGE UP (ref 96–108)
CO2 SERPL-SCNC: 27 MMOL/L — SIGNIFICANT CHANGE UP (ref 22–31)
CREAT SERPL-MCNC: 1.17 MG/DL — SIGNIFICANT CHANGE UP (ref 0.5–1.3)
EGFR: 89 ML/MIN/1.73M2 — SIGNIFICANT CHANGE UP
EOSINOPHIL # BLD AUTO: 0 K/UL — SIGNIFICANT CHANGE UP (ref 0–0.5)
EOSINOPHIL NFR BLD AUTO: 0 % — SIGNIFICANT CHANGE UP (ref 0–6)
GLUCOSE SERPL-MCNC: 149 MG/DL — HIGH (ref 70–99)
HCT VFR BLD CALC: 40.5 % — SIGNIFICANT CHANGE UP (ref 39–50)
HGB BLD-MCNC: 13.7 G/DL — SIGNIFICANT CHANGE UP (ref 13–17)
IMM GRANULOCYTES NFR BLD AUTO: 0.6 % — SIGNIFICANT CHANGE UP (ref 0–1.5)
LYMPHOCYTES # BLD AUTO: 15.8 % — SIGNIFICANT CHANGE UP (ref 13–44)
LYMPHOCYTES # BLD AUTO: 2.15 K/UL — SIGNIFICANT CHANGE UP (ref 1–3.3)
MAGNESIUM SERPL-MCNC: 2 MG/DL — SIGNIFICANT CHANGE UP (ref 1.6–2.6)
MCHC RBC-ENTMCNC: 29 PG — SIGNIFICANT CHANGE UP (ref 27–34)
MCHC RBC-ENTMCNC: 33.8 GM/DL — SIGNIFICANT CHANGE UP (ref 32–36)
MCV RBC AUTO: 85.6 FL — SIGNIFICANT CHANGE UP (ref 80–100)
MONOCYTES # BLD AUTO: 0.95 K/UL — HIGH (ref 0–0.9)
MONOCYTES NFR BLD AUTO: 7 % — SIGNIFICANT CHANGE UP (ref 2–14)
NEUTROPHILS # BLD AUTO: 10.41 K/UL — HIGH (ref 1.8–7.4)
NEUTROPHILS NFR BLD AUTO: 76.3 % — SIGNIFICANT CHANGE UP (ref 43–77)
NRBC # BLD: 0 /100 WBCS — SIGNIFICANT CHANGE UP (ref 0–0)
PHOSPHATE SERPL-MCNC: 3.2 MG/DL — SIGNIFICANT CHANGE UP (ref 2.5–4.5)
PLATELET # BLD AUTO: 279 K/UL — SIGNIFICANT CHANGE UP (ref 150–400)
POTASSIUM SERPL-MCNC: 3.7 MMOL/L — SIGNIFICANT CHANGE UP (ref 3.5–5.3)
POTASSIUM SERPL-SCNC: 3.7 MMOL/L — SIGNIFICANT CHANGE UP (ref 3.5–5.3)
PROT SERPL-MCNC: 7.2 G/DL — SIGNIFICANT CHANGE UP (ref 6–8.3)
RBC # BLD: 4.73 M/UL — SIGNIFICANT CHANGE UP (ref 4.2–5.8)
RBC # FLD: 12.7 % — SIGNIFICANT CHANGE UP (ref 10.3–14.5)
SODIUM SERPL-SCNC: 138 MMOL/L — SIGNIFICANT CHANGE UP (ref 135–145)
WBC # BLD: 13.63 K/UL — HIGH (ref 3.8–10.5)
WBC # FLD AUTO: 13.63 K/UL — HIGH (ref 3.8–10.5)

## 2022-04-04 PROCEDURE — 87635 SARS-COV-2 COVID-19 AMP PRB: CPT

## 2022-04-04 PROCEDURE — 70450 CT HEAD/BRAIN W/O DYE: CPT | Mod: MA

## 2022-04-04 PROCEDURE — 80053 COMPREHEN METABOLIC PANEL: CPT

## 2022-04-04 PROCEDURE — 83735 ASSAY OF MAGNESIUM: CPT

## 2022-04-04 PROCEDURE — 87040 BLOOD CULTURE FOR BACTERIA: CPT

## 2022-04-04 PROCEDURE — 83605 ASSAY OF LACTIC ACID: CPT

## 2022-04-04 PROCEDURE — 70553 MRI BRAIN STEM W/O & W/DYE: CPT

## 2022-04-04 PROCEDURE — 99239 HOSP IP/OBS DSCHRG MGMT >30: CPT | Mod: GC

## 2022-04-04 PROCEDURE — 85730 THROMBOPLASTIN TIME PARTIAL: CPT

## 2022-04-04 PROCEDURE — 83935 ASSAY OF URINE OSMOLALITY: CPT

## 2022-04-04 PROCEDURE — 71045 X-RAY EXAM CHEST 1 VIEW: CPT

## 2022-04-04 PROCEDURE — 85025 COMPLETE CBC W/AUTO DIFF WBC: CPT

## 2022-04-04 PROCEDURE — 95712 VEEG 2-12 HR INTMT MNTR: CPT

## 2022-04-04 PROCEDURE — 87637 SARSCOV2&INF A&B&RSV AMP PRB: CPT

## 2022-04-04 PROCEDURE — 84300 ASSAY OF URINE SODIUM: CPT

## 2022-04-04 PROCEDURE — 85610 PROTHROMBIN TIME: CPT

## 2022-04-04 PROCEDURE — 99285 EMERGENCY DEPT VISIT HI MDM: CPT

## 2022-04-04 PROCEDURE — 95819 EEG AWAKE AND ASLEEP: CPT

## 2022-04-04 PROCEDURE — 83690 ASSAY OF LIPASE: CPT

## 2022-04-04 PROCEDURE — 80307 DRUG TEST PRSMV CHEM ANLYZR: CPT

## 2022-04-04 PROCEDURE — 84100 ASSAY OF PHOSPHORUS: CPT

## 2022-04-04 PROCEDURE — A9579: CPT

## 2022-04-04 PROCEDURE — 82570 ASSAY OF URINE CREATININE: CPT

## 2022-04-04 PROCEDURE — 96374 THER/PROPH/DIAG INJ IV PUSH: CPT

## 2022-04-04 PROCEDURE — 93005 ELECTROCARDIOGRAM TRACING: CPT

## 2022-04-04 PROCEDURE — 36415 COLL VENOUS BLD VENIPUNCTURE: CPT

## 2022-04-04 PROCEDURE — 95957 EEG DIGITAL ANALYSIS: CPT

## 2022-04-04 PROCEDURE — 96375 TX/PRO/DX INJ NEW DRUG ADDON: CPT

## 2022-04-04 PROCEDURE — 76377 3D RENDER W/INTRP POSTPROCES: CPT

## 2022-04-04 RX ORDER — ESCITALOPRAM OXALATE 10 MG/1
1 TABLET, FILM COATED ORAL
Qty: 30 | Refills: 0
Start: 2022-04-04 | End: 2022-05-03

## 2022-04-04 RX ORDER — METOCLOPRAMIDE HCL 10 MG
10 TABLET ORAL ONCE
Refills: 0 | Status: COMPLETED | OUTPATIENT
Start: 2022-04-04 | End: 2022-04-04

## 2022-04-04 RX ORDER — PANTOPRAZOLE SODIUM 20 MG/1
1 TABLET, DELAYED RELEASE ORAL
Qty: 60 | Refills: 0
Start: 2022-04-04 | End: 2022-05-03

## 2022-04-04 RX ORDER — LEVETIRACETAM 250 MG/1
1 TABLET, FILM COATED ORAL
Qty: 60 | Refills: 0
Start: 2022-04-04 | End: 2022-05-03

## 2022-04-04 RX ADMIN — ESCITALOPRAM OXALATE 10 MILLIGRAM(S): 10 TABLET, FILM COATED ORAL at 12:10

## 2022-04-04 RX ADMIN — SODIUM CHLORIDE 3 MILLILITER(S): 9 INJECTION INTRAMUSCULAR; INTRAVENOUS; SUBCUTANEOUS at 05:16

## 2022-04-04 RX ADMIN — LEVETIRACETAM 420 MILLIGRAM(S): 250 TABLET, FILM COATED ORAL at 05:22

## 2022-04-04 RX ADMIN — ONDANSETRON 4 MILLIGRAM(S): 8 TABLET, FILM COATED ORAL at 01:39

## 2022-04-04 RX ADMIN — Medication 10 MILLIGRAM(S): at 04:15

## 2022-04-04 RX ADMIN — PANTOPRAZOLE SODIUM 40 MILLIGRAM(S): 20 TABLET, DELAYED RELEASE ORAL at 05:23

## 2022-04-04 NOTE — DISCHARGE NOTE PROVIDER - HOSPITAL COURSE
22 year old male with PMHx of gastritis and  previous 2 seizure episodes  associated with Benzodiazepine withdrawal approximately seven years ago and no other seizure history presents to the ED with  a seizure that happened one day prior, witnessed by his brother.     Patient then went to [facility] where labs and CT were performed (no results available from this visit), and was then discharged on oral keppra. As per mother , Patient  that he has been vomiting all day after coming for [facility] and has been unable to take his Keppra, prompting this ER visit. Patient reports that prior the seizure he felt totally  fine until he felt this taste in his mouth , He reports that he bite his tongue and hit his head during the event.  he currently only feels nauseous and tired with no headache, neck stiffness, diarrhea, abdominal pain, chest pain, shortness of breath, focal numbness or weakness. No difficulty swallowing , visual or hearing impairment.  Patient is active smoker of MariJuana daily , last time was was one day before this admission.     Patient is admitted for Witnessed seizuire.  Followed by neurology. MRI brain and CT head resulted no acute findings. EEG normal.   Started Keppra. Lexapro started for OCD. recommended to follow up with psych at [facility] psychiatry OCD center.   Pt was monitored for projectile vomiting, resolved with Zofran prn.  UTOX positive for benzo and THC.   LUCIANO improving with IV fluid. started PPI.     Patient is medically optimized for discharge home with outpatient follow up.   Please refer to medical records/progress notes for in depth hospital course.   Discharge plan discussed with family and patient at bedside.    22 year old male with PMHx of gastritis and  previous 2 seizure episodes  associated with Benzodiazepine withdrawal approximately seven years ago and no other seizure history presents to the ED with  a seizure that happened one day prior, witnessed by his brother.     Patient then went to [facility] where labs and CT were performed (no results available from this visit), and was then discharged on oral keppra. As per mother , Patient  that he has been vomiting all day after coming for [facility] and has been unable to take his Keppra, prompting this ER visit. Patient reports that prior the seizure he felt totally  fine until he felt this taste in his mouth , He reports that he bite his tongue and hit his head during the event.  he currently only feels nauseous and tired with no headache, neck stiffness, diarrhea, abdominal pain, chest pain, shortness of breath, focal numbness or weakness. No difficulty swallowing , visual or hearing impairment.  Patient is active smoker of MariJuana daily , last time was was one day before this admission.     Patient is admitted for Witnessed seizuire.  Followed by neurology. MRI brain and CT head resulted no acute findings. EEG normal.   Started Keppra. Lexapro started for OCD. recommended to follow up with psych at [facility] psychiatry OCD center.   Pt was monitored for vomiting, resolved with Zofran prn.  UTOX positive for benzo and THC.   LUCIANO improving with IV fluid. started PPI.     Patient is medically optimized for discharge home with outpatient follow up.   Please refer to medical records/progress notes for in depth hospital course.   Discharge plan discussed with family and patient at bedside.

## 2022-04-04 NOTE — DISCHARGE NOTE NURSING/CASE MANAGEMENT/SOCIAL WORK - NSDCFUADDAPPT_GEN_ALL_CORE_FT
Follow up with [facility]  OCD Center- You can enroll as a new patient email  [email or web address], or call [phone] for an appointment.

## 2022-04-04 NOTE — DISCHARGE NOTE PROVIDER - NSDCCPCAREPLAN_GEN_ALL_CORE_FT
PRINCIPAL DISCHARGE DIAGNOSIS  Diagnosis: Seizure  Assessment and Plan of Treatment: You were admitted for seizure activity and followed by neurology.   MRI brain shows no acute findings. underwent EEG with normal result.   Continue Keppra 500mg twice a day.   Follow up with neurology 1-2 weeks after discharge.      SECONDARY DISCHARGE DIAGNOSES  Diagnosis: LUCIANO (acute kidney injury)  Assessment and Plan of Treatment: Your kidney functions are slightly deteriorated likely due to decreased fluid and received IV fluid. Now improivng kidney function.   Maintain good oral hydration.   Acute kidney injury (LUCIANO) is when the kidneys suddenly stop working effectively.  This can happen when there is less blood flow to the kidneys, there is kidney damage or the path for urine to leave the body is blocked.  LUCIANO can cause decreased urination, blood in the urine, swelling, vomiting, weakness, confusion and/or seisures.  The treatment depends on the cause and severity of the injury.  In any case, while your kidneys are healing you should avoid agents that can cause kidney injury.  Some of these agents include NSAIDs, Gadolinium contrast, and Phosphate containing enemas.      Diagnosis: Gastritis  Assessment and Plan of Treatment: Contiune protonix twice a day.   Call your healthcare provider:  If you have belly pain that gets worse or doesn't go away  If you vomit blood or have black bowel movements  If you are losing weight (without trying)  Avoid NSAIDs- (Aspirin, Ibuprofen, Advil, Motrin, Naproxen, Aleve)  Avoid alcoholic beverages  Follow up with your own gastroenterologist regularly.       Diagnosis: History of OCD (obsessive compulsive disorder)  Assessment and Plan of Treatment: start medication as recommended -Lexapro 10mg  Follow up with [facility]  OCD Center- You can enroll as a new patient email  [email or web address], or call [phone] for an appointment.

## 2022-04-04 NOTE — DISCHARGE NOTE PROVIDER - NSDCMRMEDTOKEN_GEN_ALL_CORE_FT
escitalopram 10 mg oral tablet: 1 tab(s) orally once a day  levETIRAcetam 500 mg oral tablet, dispersible: 1 tab(s) orally 2 times a day   Protonix 40 mg oral delayed release tablet: 1 tab(s) orally 2 times a day

## 2022-04-04 NOTE — DISCHARGE NOTE NURSING/CASE MANAGEMENT/SOCIAL WORK - NSDCPEWEB_GEN_ALL_CORE
[facility] for Tobacco Control website --- [email or web address] website --- [email or web address]

## 2022-04-04 NOTE — DISCHARGE NOTE PROVIDER - PROVIDER TOKENS
PROVIDER:[TOKEN:[[id number]],FOLLOWUP:[1 week]],FREE:[LAST:[Lobo],FIRST:[John],PHONE:[phone]],FAX:[(   )    -],FOLLOWUP:[1 week]]

## 2022-04-04 NOTE — DISCHARGE NOTE NURSING/CASE MANAGEMENT/SOCIAL WORK - NSDCPEEMAIL_GEN_ALL_CORE
[facility] for Tobacco Control email [email or web address]

## 2022-04-04 NOTE — DISCHARGE NOTE NURSING/CASE MANAGEMENT/SOCIAL WORK - NSDCPEFALRISK_GEN_ALL_CORE
For information on Fall & Injury Prevention, visit: [email or web address] OR  [email or web address] OR  https://www.cdc.gov/steadi/patient.html

## 2022-04-04 NOTE — DISCHARGE NOTE PROVIDER - NSDCFUADDAPPT_GEN_ALL_CORE_FT
Follow up with [facility]  OCD Center- You can enroll as a new patient email  [email or web address], or call [phone] for an appointment.

## 2022-04-04 NOTE — DISCHARGE NOTE PROVIDER - ATTENDING DISCHARGE PHYSICAL EXAMINATION:
Patient was seen and examined at bedside, reports N/V has resolved     Vitals noted     P/E:  GENERAL: NAD, well-groomed, well-developed  HEAD:  Atraumatic, Normocephalic  NERVOUS SYSTEM:  Alert & Oriented X3, Good concentration; Motor Strength 5/5 B/L upper and lower extremities  CHEST/LUNG: Clear to auscultation bilaterally; No rales, rhonchi, wheezing, or rubs  HEART: Regular rate and rhythm; No murmurs, rubs, or gallops  ABDOMEN: Soft, Nontender, Nondistended; Bowel sounds present  EXTREMITIES:  2+ Peripheral Pulses, No clubbing, cyanosis, or edema  SKIN: No rashes or lesions    Labs noted     A/P:  Seizure   Cannabis hyperemesis syndrome   LUCIANO  Leukocytosis     Plan:  EEG and MRI unremarkable   Cont Keppra 500mg BID  Creatinine and WBC improving   Monitor renal function  Will follow blood culture   Seizure and fall precaution discussed with patient   Stable to be discharged  Plan of care was discussed with patient; all questions and concerns were addressed and care was aligned with patient's wishes.

## 2022-04-04 NOTE — DISCHARGE NOTE NURSING/CASE MANAGEMENT/SOCIAL WORK - PATIENT PORTAL LINK FT
You can access the FollowMyHealth Patient Portal offered by [facility] by registering at the following website: [email or web address]. By joining [employer]’s FollowMyHealth portal, you will also be able to view your health information using other applications (apps) compatible with our system.

## 2022-04-04 NOTE — DISCHARGE NOTE PROVIDER - CARE PROVIDER_API CALL
Nelson Krause)  Clinical Neurophysiology; Neurology; Sleep Medicine  [address] [place] [address]  Phone: [phone]  Fax: [phone]  Follow Up Time: 1 week    John Diamond  Phone: [phone]  Fax: (   )    -  Follow Up Time: 1 week

## 2022-04-08 LAB
CULTURE RESULTS: SIGNIFICANT CHANGE UP
CULTURE RESULTS: SIGNIFICANT CHANGE UP
SPECIMEN SOURCE: SIGNIFICANT CHANGE UP
SPECIMEN SOURCE: SIGNIFICANT CHANGE UP

## 2022-06-23 ENCOUNTER — EMERGENCY (EMERGENCY)
Facility: HOSPITAL | Age: 22
LOS: 1 days | Discharge: HOME | End: 2022-06-23
Attending: EMERGENCY MEDICINE
Payer: COMMERCIAL

## 2022-06-23 VITALS
OXYGEN SATURATION: 100 % | SYSTOLIC BLOOD PRESSURE: 128 MMHG | DIASTOLIC BLOOD PRESSURE: 91 MMHG | TEMPERATURE: 98 F | HEIGHT: 66 IN | HEART RATE: 84 BPM | WEIGHT: 179.9 LBS | RESPIRATION RATE: 16 BRPM

## 2022-06-23 VITALS
HEART RATE: 70 BPM | RESPIRATION RATE: 16 BRPM | SYSTOLIC BLOOD PRESSURE: 137 MMHG | OXYGEN SATURATION: 96 % | DIASTOLIC BLOOD PRESSURE: 81 MMHG | TEMPERATURE: 98 F

## 2022-06-23 PROBLEM — R56.9 UNSPECIFIED CONVULSIONS: Chronic | Status: ACTIVE | Noted: 2022-04-02

## 2022-06-23 PROBLEM — K29.70 GASTRITIS, UNSPECIFIED, WITHOUT BLEEDING: Chronic | Status: ACTIVE | Noted: 2022-04-02

## 2022-06-23 LAB
ALBUMIN SERPL ELPH-MCNC: 3.8 G/DL — SIGNIFICANT CHANGE UP (ref 3.5–5)
ALP SERPL-CCNC: 89 U/L — SIGNIFICANT CHANGE UP (ref 40–120)
ALT FLD-CCNC: 49 U/L DA — SIGNIFICANT CHANGE UP (ref 10–60)
ANION GAP SERPL CALC-SCNC: 8 MMOL/L — SIGNIFICANT CHANGE UP (ref 5–17)
AST SERPL-CCNC: 18 U/L — SIGNIFICANT CHANGE UP (ref 10–40)
BASOPHILS # BLD AUTO: 0.08 K/UL — SIGNIFICANT CHANGE UP (ref 0–0.2)
BASOPHILS NFR BLD AUTO: 0.6 % — SIGNIFICANT CHANGE UP (ref 0–2)
BILIRUB SERPL-MCNC: 0.3 MG/DL — SIGNIFICANT CHANGE UP (ref 0.2–1.2)
BUN SERPL-MCNC: 19 MG/DL — HIGH (ref 7–18)
CALCIUM SERPL-MCNC: 9.4 MG/DL — SIGNIFICANT CHANGE UP (ref 8.4–10.5)
CHLORIDE SERPL-SCNC: 103 MMOL/L — SIGNIFICANT CHANGE UP (ref 96–108)
CO2 SERPL-SCNC: 26 MMOL/L — SIGNIFICANT CHANGE UP (ref 22–31)
CREAT SERPL-MCNC: 1.01 MG/DL — SIGNIFICANT CHANGE UP (ref 0.5–1.3)
EGFR: 106 ML/MIN/1.73M2 — SIGNIFICANT CHANGE UP
EOSINOPHIL # BLD AUTO: 0.38 K/UL — SIGNIFICANT CHANGE UP (ref 0–0.5)
EOSINOPHIL NFR BLD AUTO: 2.8 % — SIGNIFICANT CHANGE UP (ref 0–6)
GLUCOSE SERPL-MCNC: 102 MG/DL — HIGH (ref 70–99)
HCT VFR BLD CALC: 43.1 % — SIGNIFICANT CHANGE UP (ref 39–50)
HGB BLD-MCNC: 14.3 G/DL — SIGNIFICANT CHANGE UP (ref 13–17)
IMM GRANULOCYTES NFR BLD AUTO: 0.3 % — SIGNIFICANT CHANGE UP (ref 0–1.5)
LYMPHOCYTES # BLD AUTO: 22.6 % — SIGNIFICANT CHANGE UP (ref 13–44)
LYMPHOCYTES # BLD AUTO: 3.07 K/UL — SIGNIFICANT CHANGE UP (ref 1–3.3)
MAGNESIUM SERPL-MCNC: 2 MG/DL — SIGNIFICANT CHANGE UP (ref 1.6–2.6)
MCHC RBC-ENTMCNC: 29.1 PG — SIGNIFICANT CHANGE UP (ref 27–34)
MCHC RBC-ENTMCNC: 33.2 GM/DL — SIGNIFICANT CHANGE UP (ref 32–36)
MCV RBC AUTO: 87.8 FL — SIGNIFICANT CHANGE UP (ref 80–100)
MONOCYTES # BLD AUTO: 0.79 K/UL — SIGNIFICANT CHANGE UP (ref 0–0.9)
MONOCYTES NFR BLD AUTO: 5.8 % — SIGNIFICANT CHANGE UP (ref 2–14)
NEUTROPHILS # BLD AUTO: 9.23 K/UL — HIGH (ref 1.8–7.4)
NEUTROPHILS NFR BLD AUTO: 67.9 % — SIGNIFICANT CHANGE UP (ref 43–77)
NRBC # BLD: 0 /100 WBCS — SIGNIFICANT CHANGE UP (ref 0–0)
PLATELET # BLD AUTO: 247 K/UL — SIGNIFICANT CHANGE UP (ref 150–400)
POTASSIUM SERPL-MCNC: 3.7 MMOL/L — SIGNIFICANT CHANGE UP (ref 3.5–5.3)
POTASSIUM SERPL-SCNC: 3.7 MMOL/L — SIGNIFICANT CHANGE UP (ref 3.5–5.3)
PROT SERPL-MCNC: 7 G/DL — SIGNIFICANT CHANGE UP (ref 6–8.3)
RBC # BLD: 4.91 M/UL — SIGNIFICANT CHANGE UP (ref 4.2–5.8)
RBC # FLD: 12.7 % — SIGNIFICANT CHANGE UP (ref 10.3–14.5)
SODIUM SERPL-SCNC: 137 MMOL/L — SIGNIFICANT CHANGE UP (ref 135–145)
WBC # BLD: 13.59 K/UL — HIGH (ref 3.8–10.5)
WBC # FLD AUTO: 13.59 K/UL — HIGH (ref 3.8–10.5)

## 2022-06-23 PROCEDURE — 96374 THER/PROPH/DIAG INJ IV PUSH: CPT

## 2022-06-23 PROCEDURE — 80053 COMPREHEN METABOLIC PANEL: CPT

## 2022-06-23 PROCEDURE — 83735 ASSAY OF MAGNESIUM: CPT

## 2022-06-23 PROCEDURE — 36415 COLL VENOUS BLD VENIPUNCTURE: CPT

## 2022-06-23 PROCEDURE — 82962 GLUCOSE BLOOD TEST: CPT

## 2022-06-23 PROCEDURE — 85025 COMPLETE CBC W/AUTO DIFF WBC: CPT

## 2022-06-23 PROCEDURE — 99284 EMERGENCY DEPT VISIT MOD MDM: CPT | Mod: 25

## 2022-06-23 PROCEDURE — 99284 EMERGENCY DEPT VISIT MOD MDM: CPT

## 2022-06-23 RX ORDER — LEVETIRACETAM 250 MG/1
1 TABLET, FILM COATED ORAL
Qty: 60 | Refills: 0
Start: 2022-06-23 | End: 2022-07-22

## 2022-06-23 RX ORDER — LEVETIRACETAM 250 MG/1
1500 TABLET, FILM COATED ORAL ONCE
Refills: 0 | Status: COMPLETED | OUTPATIENT
Start: 2022-06-23 | End: 2022-06-23

## 2022-06-23 RX ADMIN — LEVETIRACETAM 400 MILLIGRAM(S): 250 TABLET, FILM COATED ORAL at 03:29

## 2022-06-23 NOTE — ED PROVIDER NOTE - OBJECTIVE STATEMENT
22 year old male PMH gastritis and seizures coming in with the sensation that he is going to have a seizure. pt states the last time he had a seizure he felt burning/tingling around and in his mouth and felt that again tonight. states he was having tremors around his body but didn't have a seizure. did had a period of time where he felt like it was difficult to speak but resolved. denies all complaints at this time. hasn't had neuro f/u since last seizure 2/2 insurance issues and was supposed to be taking keppra but ran out.

## 2022-06-23 NOTE — ED PROVIDER NOTE - PATIENT PORTAL LINK FT
You can access the FollowMyHealth Patient Portal offered by [facility] by registering at the following website: [email or web address]. By joining [email or web address] FollowMyHealth portal, you will also be able to view your health information using other applications (apps) compatible with our system.

## 2022-06-23 NOTE — ED PROVIDER NOTE - NSFOLLOWUPINSTRUCTIONS_ED_ALL_ED_FT
[facility] [place]namese                                                                                                                                                                                                           Seizure, Adult      A seizure is a sudden burst of abnormal electrical and chemical activity in the brain. Seizures usually last from 30 seconds to 2 minutes. The abnormal activity temporarily interrupts normal brain function.    Many types of seizures can affect adults. A seizure can cause many different symptoms depending on where in the brain it starts.      What are the causes?    Common causes of this condition include:  •Fever or infection.      •Brain injury, head trauma, bleeding in the brain, or a brain tumor.      •Low levels of blood sugar or salt (sodium).      •Kidney problems or liver problems.      •Metabolic disorders or other conditions that are passed from parent to child (are inherited).      •Reaction to a substance, such as a drug or a medicine, or suddenly stopping the use of a substance (withdrawal).      •A stroke.      •Developmental disorders such as autism spectrum disorder or cerebral palsy.      In some cases, the cause of a seizure may not be known. Some people who have a seizure never have another one. A person who has repeated seizures over time without a clear cause has a condition called epilepsy.      What increases the risk?    You are more likely to develop this condition if:  •You have a family history of epilepsy.      •You have had a tonic–clonic seizure before. This type of seizure causes tightening (contraction) of the muscles of the whole body and loss of consciousness.      •You have a history of head trauma, lack of oxygen at birth, or strokes.        What are the signs or symptoms?    There are many different types of seizures. The symptoms vary depending on the type of seizure you have. Symptoms occur during the seizure. They may also occur before a seizure (aura) and after a seizure (postictal). Symptoms may include the following:    Symptoms during a seizure     •Uncontrollable shaking (convulsions) with fast, jerky movements of muscles.      •Stiffening of the body.      •Breathing problems.      •Confusion, staring, or unresponsiveness.      •Head nodding, eye blinking or fluttering, or rapid eye movements.      •Drooling, grunting, or making clicking sounds with your mouth.      •Loss of bladder control and bowel control.      Symptoms before a seizure     •Fear or anxiety.      •Nausea.    •Vertigo. This is a feeling like:  •You are moving when you are not.      •Your surroundings are moving when they are not.        •Déjà vu. This is a feeling of having seen or heard something before.      •Odd tastes or smells.      •Changes in vision, such as seeing flashing lights or spots.      Symptoms after a seizure     •Confusion.      •Sleepiness.      •Headache.      •Sore muscles.        How is this diagnosed?    This condition may be diagnosed based on:  •A description of your symptoms. Video of your seizures can be helpful.      •Your medical history.      •A physical exam.      You may also have tests, including:  •Blood tests.      •CT scan.      •MRI.      •Electroencephalogram (EEG). This test measures electrical activity in the brain. An EEG can predict whether seizures will return.      •A spinal tap, also called a lumbar puncture. This is the removal and testing of fluid that surrounds the brain and spinal cord.        How is this treated?    Most seizures will stop on their own in less than 5 minutes, and no treatment is needed. Seizures that last longer than 5 minutes will usually need treatment.    Seizures may be treated with:  •Medicines given through an IV.      •Avoiding known triggers, such as medicines that you take for another condition.      •Medicines to control seizures or prevent future seizures (antiepileptics), if epilepsy caused your seizures.      •Medical devices to prevent and control seizures.      •Surgery to stop seizures or to reduce how often seizures happen, if you have epilepsy that does not respond to medicines.      •A diet low in carbohydrates and high in fat (ketogenic diet).        Follow these instructions at home:    Medicines     •Take over-the-counter and prescription medicines only as told by your health care provider.      •Avoid any substances that may prevent your medicine from working properly, such as alcohol.      Activity     •Follow instructions about activities, such as driving or swimming, that would be dangerous if you had another seizure. Wait until your health care provider says it is safe to do them.      •If you live in the [place] check with your local department of motor vehicles (DMV) to find out about local driving laws. Each state has specific rules about when you can legally drive again.      •Get enough rest. Lack of sleep can make seizures more likely to occur.        Educating others    •Teach friends and family what to do if you have a seizure. They should:  •Help you get down to the ground, to prevent a fall.      •Cushion your head and move items away from your body.      •Loosen any tight clothing around your neck.      •Turn you on your side. If you vomit, this helps keep your airway clear.      •Know whether or not you need emergency care.      •Stay with you until you recover.      •Also, tell them what not to do if you have a seizure. Tell them:  •They should not hold you down. Holding you down will not stop the seizure.      •They should not put anything in your mouth.        General instructions     •Avoid anything that has ever triggered a seizure for you.      •Keep a seizure diary. Record what you remember about each seizure, especially anything that might have triggered it.      •Keep all follow-up visits. This is important.        Contact a health care provider if:    •You have another seizure or seizures. Call each time you have a seizure.      •Your seizure pattern changes.      •You continue to have seizures with treatment.      •You have symptoms of an infection or illness. Either of these might increase your risk of having a seizure.      •You are unable to take your medicine.        Get help right away if:  •You have:  •A seizure that does not stop after 5 minutes.      •Several seizures in a row without a complete recovery between seizures.      •A seizure that makes it harder to breathe.      •A seizure that leaves you unable to speak or use a part of your body.        •You do not wake up right away after a seizure.      •You injure yourself during a seizure.      •You have confusion or pain right after a seizure.      These symptoms may represent a serious problem that is an emergency. Do not wait to see if the symptoms will go away. Get medical help right away. Call your local emergency services (911 in the [place] Do not drive yourself to the hospital.       Summary    •Seizures are caused by abnormal electrical and chemical activity in the brain. The activity disrupts normal brain function and can cause various symptoms.      •Seizures have many causes, including illness, head injuries, low levels of blood sugar or salt, and certain conditions.      •Most seizures will stop on their own in less than 5 minutes. Seizures that last longer than 5 minutes are a medical emergency and need treatment right away.      •Many medicines are used to treat seizures. Take over-the-counter and prescription medicines only as told by your health care provider.      This information is not intended to replace advice given to you by your health care provider. Make sure you discuss any questions you have with your health care provider.      Document Revised: 06/25/2021 Document Reviewed: 06/25/2021    Elsevier Patient Education © 2022 [employer]

## 2022-06-23 NOTE — ED ADULT NURSE NOTE - OBJECTIVE STATEMENT
Pt came to er r/t feeling discomfort, "felt like he get a seizure" as per pt verbalization. PMHx seizure

## 2022-06-23 NOTE — ED PROVIDER NOTE - CLINICAL SUMMARY MEDICAL DECISION MAKING FREE TEXT BOX
22 year old male with feeling like he is going to have a seizure. PE as above.  labs, keppra, reassess

## 2022-08-12 ENCOUNTER — EMERGENCY (EMERGENCY)
Facility: HOSPITAL | Age: 22
LOS: 1 days | Discharge: HOME | End: 2022-08-12
Attending: STUDENT IN AN ORGANIZED HEALTH CARE EDUCATION/TRAINING PROGRAM
Payer: COMMERCIAL

## 2022-08-12 VITALS
RESPIRATION RATE: 18 BRPM | WEIGHT: 195.11 LBS | HEART RATE: 86 BPM | HEIGHT: 66 IN | TEMPERATURE: 98 F | OXYGEN SATURATION: 98 % | SYSTOLIC BLOOD PRESSURE: 150 MMHG | DIASTOLIC BLOOD PRESSURE: 90 MMHG

## 2022-08-12 LAB
ALBUMIN SERPL ELPH-MCNC: 3.9 G/DL — SIGNIFICANT CHANGE UP (ref 3.5–5)
ALP SERPL-CCNC: 93 U/L — SIGNIFICANT CHANGE UP (ref 40–120)
ALT FLD-CCNC: 51 U/L DA — SIGNIFICANT CHANGE UP (ref 10–60)
ANION GAP SERPL CALC-SCNC: 4 MMOL/L — LOW (ref 5–17)
APTT BLD: 36.6 SEC — HIGH (ref 27.5–35.5)
AST SERPL-CCNC: 19 U/L — SIGNIFICANT CHANGE UP (ref 10–40)
BASOPHILS # BLD AUTO: 0.05 K/UL — SIGNIFICANT CHANGE UP (ref 0–0.2)
BASOPHILS NFR BLD AUTO: 0.6 % — SIGNIFICANT CHANGE UP (ref 0–2)
BILIRUB SERPL-MCNC: 0.2 MG/DL — SIGNIFICANT CHANGE UP (ref 0.2–1.2)
BUN SERPL-MCNC: 11 MG/DL — SIGNIFICANT CHANGE UP (ref 7–18)
CALCIUM SERPL-MCNC: 9 MG/DL — SIGNIFICANT CHANGE UP (ref 8.4–10.5)
CHLORIDE SERPL-SCNC: 106 MMOL/L — SIGNIFICANT CHANGE UP (ref 96–108)
CO2 SERPL-SCNC: 30 MMOL/L — SIGNIFICANT CHANGE UP (ref 22–31)
CREAT SERPL-MCNC: 0.93 MG/DL — SIGNIFICANT CHANGE UP (ref 0.5–1.3)
EGFR: 118 ML/MIN/1.73M2 — SIGNIFICANT CHANGE UP
EOSINOPHIL # BLD AUTO: 0.32 K/UL — SIGNIFICANT CHANGE UP (ref 0–0.5)
EOSINOPHIL NFR BLD AUTO: 3.8 % — SIGNIFICANT CHANGE UP (ref 0–6)
GLUCOSE SERPL-MCNC: 96 MG/DL — SIGNIFICANT CHANGE UP (ref 70–99)
HCT VFR BLD CALC: 45.7 % — SIGNIFICANT CHANGE UP (ref 39–50)
HGB BLD-MCNC: 15 G/DL — SIGNIFICANT CHANGE UP (ref 13–17)
IMM GRANULOCYTES NFR BLD AUTO: 0.4 % — SIGNIFICANT CHANGE UP (ref 0–1.5)
INR BLD: 0.92 RATIO — SIGNIFICANT CHANGE UP (ref 0.88–1.16)
LYMPHOCYTES # BLD AUTO: 2.57 K/UL — SIGNIFICANT CHANGE UP (ref 1–3.3)
LYMPHOCYTES # BLD AUTO: 30.4 % — SIGNIFICANT CHANGE UP (ref 13–44)
MCHC RBC-ENTMCNC: 29.2 PG — SIGNIFICANT CHANGE UP (ref 27–34)
MCHC RBC-ENTMCNC: 32.8 GM/DL — SIGNIFICANT CHANGE UP (ref 32–36)
MCV RBC AUTO: 89.1 FL — SIGNIFICANT CHANGE UP (ref 80–100)
MONOCYTES # BLD AUTO: 0.68 K/UL — SIGNIFICANT CHANGE UP (ref 0–0.9)
MONOCYTES NFR BLD AUTO: 8 % — SIGNIFICANT CHANGE UP (ref 2–14)
NEUTROPHILS # BLD AUTO: 4.8 K/UL — SIGNIFICANT CHANGE UP (ref 1.8–7.4)
NEUTROPHILS NFR BLD AUTO: 56.8 % — SIGNIFICANT CHANGE UP (ref 43–77)
NRBC # BLD: 0 /100 WBCS — SIGNIFICANT CHANGE UP (ref 0–0)
PLATELET # BLD AUTO: 274 K/UL — SIGNIFICANT CHANGE UP (ref 150–400)
POTASSIUM SERPL-MCNC: 4.8 MMOL/L — SIGNIFICANT CHANGE UP (ref 3.5–5.3)
POTASSIUM SERPL-SCNC: 4.8 MMOL/L — SIGNIFICANT CHANGE UP (ref 3.5–5.3)
PROT SERPL-MCNC: 7.2 G/DL — SIGNIFICANT CHANGE UP (ref 6–8.3)
PROTHROM AB SERPL-ACNC: 10.9 SEC — SIGNIFICANT CHANGE UP (ref 10.5–13.4)
RBC # BLD: 5.13 M/UL — SIGNIFICANT CHANGE UP (ref 4.2–5.8)
RBC # FLD: 12.4 % — SIGNIFICANT CHANGE UP (ref 10.3–14.5)
SODIUM SERPL-SCNC: 140 MMOL/L — SIGNIFICANT CHANGE UP (ref 135–145)
WBC # BLD: 8.45 K/UL — SIGNIFICANT CHANGE UP (ref 3.8–10.5)
WBC # FLD AUTO: 8.45 K/UL — SIGNIFICANT CHANGE UP (ref 3.8–10.5)

## 2022-08-12 PROCEDURE — 85025 COMPLETE CBC W/AUTO DIFF WBC: CPT

## 2022-08-12 PROCEDURE — 85730 THROMBOPLASTIN TIME PARTIAL: CPT

## 2022-08-12 PROCEDURE — 80053 COMPREHEN METABOLIC PANEL: CPT

## 2022-08-12 PROCEDURE — 93005 ELECTROCARDIOGRAM TRACING: CPT

## 2022-08-12 PROCEDURE — 85610 PROTHROMBIN TIME: CPT

## 2022-08-12 PROCEDURE — 99284 EMERGENCY DEPT VISIT MOD MDM: CPT

## 2022-08-12 PROCEDURE — 99283 EMERGENCY DEPT VISIT LOW MDM: CPT

## 2022-08-12 PROCEDURE — 36415 COLL VENOUS BLD VENIPUNCTURE: CPT

## 2022-08-12 RX ORDER — SODIUM CHLORIDE 9 MG/ML
1000 INJECTION INTRAMUSCULAR; INTRAVENOUS; SUBCUTANEOUS ONCE
Refills: 0 | Status: COMPLETED | OUTPATIENT
Start: 2022-08-12 | End: 2022-08-12

## 2022-08-12 RX ORDER — SODIUM CHLORIDE 9 MG/ML
3 INJECTION INTRAMUSCULAR; INTRAVENOUS; SUBCUTANEOUS EVERY 8 HOURS
Refills: 0 | Status: DISCONTINUED | OUTPATIENT
Start: 2022-08-12 | End: 2022-08-16

## 2022-08-12 RX ADMIN — SODIUM CHLORIDE 1000 MILLILITER(S): 9 INJECTION INTRAMUSCULAR; INTRAVENOUS; SUBCUTANEOUS at 22:35

## 2022-08-12 NOTE — ED PROVIDER NOTE - PATIENT PORTAL LINK FT
You can access the FollowMyHealth Patient Portal offered by [facility] by registering at the following website: [email or web address]. By joining [employer]’s FollowMyHealth portal, you will also be able to view your health information using other applications (apps) compatible with our system.

## 2022-08-12 NOTE — ED PROVIDER NOTE - OBJECTIVE STATEMENT
21 y/o male with history of seizure (on Keppra though states that he has had consecutive nml EEGs and neuro considering discontinue Keppra), gastritis, p/w 3 days of intermittent lightheadedness, described as imbalance worse with turning head and standing up as well as flushed and tingling sensations in neck and upper body with no other associated symptoms. Patient denies previously similar symptoms. Patient denies any history of heart problems and has no recent seizure activity. Patient denies recent fever or infectious symptoms, nausea, vomiting, diarrhea, chest pain, shortness of breath, syncope, focal numbness/weakness, headache, or any other recent illnesses and hospitalizations.

## 2022-08-12 NOTE — ED ADULT NURSE NOTE - OBJECTIVE STATEMENT
Pt presents to the ED with c/o lightheadedness and dizziness since Tuesday. Pt presents to the ED with c/o lightheadedness and dizziness since Tuesday. Denies fever, nausea, vomiting, headache, chest pain, or SOB.

## 2022-08-12 NOTE — ED PROVIDER NOTE - NSFOLLOWUPCLINICS_GEN_ALL_ED_FT
[facility]  Cardiology  [address], [address]  [place] [address]  Phone: [phone]  Fax:   Follow Up Time: 4-6 Days

## 2022-08-12 NOTE — ED PROVIDER NOTE - CLINICAL SUMMARY MEDICAL DECISION MAKING FREE TEXT BOX
Patient p/w lightheadedness and paresthesias. Labs and EKG pending. Patient stable and will reassess. Patient p/w lightheadedness and paresthesias. Labs and EKG pending. Patient stable and will reassess.    EKG and labs unremarkable. Given pt having some palpitations with dizziness will give cards f/u. Most likely non emergent etiology of symptoms- the details of the case, history, and exam make more emergent diagnoses much less likely. Discussed with pt my clinical impression and results, patient given strict return precautions if persistent or worsening of symptoms occurs, and need for close follow up. Pt expressed understanding and agrees with plan. Pt is well appearing with a reassuring exam. Discharge home with PMD or Specialist f/u within 5 days.

## 2022-08-12 NOTE — ED PROVIDER NOTE - NSFOLLOWUPINSTRUCTIONS_ED_ALL_ED_FT
You were seen in the emergency room today for dizziness. Please call your primary doctor to inform them of this ER visit and obtain the next available appointment within the next 5 days. As we discussed, return to the ER if you have any worsening symptoms.    We no longer feel that you need further emergency care or admission to the hospital at this time.    While we have determined that you are currently stable for discharge, we know that things can change. Please seek immediate medical attention or return to the ER if you experience any of the following:  Any worsening or persistent symptoms  Severe Pain  Chest Pain  Difficulty Breathing  Bleeding  Passing Out  Severe Rash  Inability to Eat or Drink  Persistent Fever    Please see a primary care doctor or specialist within 5 days to ensure that you are improving.    Please call the [facility] phone numbers on this document if you have any problems obtaining a follow up appointment.    I wish you well! -Dr Castaneda

## 2022-09-23 ENCOUNTER — APPOINTMENT (OUTPATIENT)
Dept: NEUROLOGY | Facility: CLINIC | Age: 22
End: 2022-09-23

## 2022-09-23 PROBLEM — Z00.00 ENCOUNTER FOR PREVENTIVE HEALTH EXAMINATION: Status: ACTIVE | Noted: 2022-09-23

## 2022-09-23 NOTE — DATA REVIEWED
[de-identified] : \par ACC: [id number] EXAM: MR BRAIN SEIZURE EP WAW IC 3D#\par \par PROCEDURE DATE: 04/03/2022\par \par \par \par INTERPRETATION: Contrast-enhanced MRI of the brain.\par \par CLINICAL INDICATION: Seizures\par \par TECHNIQUE: Multiplanar, multisequence MR images of the brain were obtained before and after the intravenous administration of 9 cc of Gadavist. 1 cc was discarded. Special attention was paid to the temporal lobes.\par \par COMPARISON: CT brain 4/2/2022\par \par FINDINGS:\par \par No hydrocephalus, midline shift, mass effect, vasogenic edema, or acute intracranial hemorrhage. Diffusion weighted images demonstrate no acute territorial infarct.\par \par No focal parenchymal signal abnormality.\par \par Hippocampal gyri are visually symmetric in in size and signal characteristics.\par \par No abnormal parenchymal or leptomeningeal enhancement.\par \par Flow voids are seen within the major intracranial vessels consistent with their patency.\par \par Visualized paranasal sinuses and mastoid air cells are clear.\par \par Orbits, sellar and suprasellar structures, and craniocervical junction are unremarkable.\par \par IMPRESSION:\par \par No hydrocephalus, mass effect, acute intracranial hemorrhage, vasogenic edema, or acute territorial infarct.\par \par No focal parenchymal signal abnormality.\par \par No evidence for mesial temporal sclerosis, gray matter heterotopia, or cortical dysplasia.\par \par No abnormal parenchymal or leptomeningeal enhancement.\par \par --- End of Report ---\par \par \par \par \par \par JOSE MORFIN MD; Attending Radiologist\par This document has been electronically signed. Apr 3 2022 3:24PM [de-identified] : \par Ventricular Rate 62 BPM\par \par Atrial Rate 62 BPM\par \par P-R Interval 148 ms\par \par QRS Duration 86 ms\par \par Q-T Interval 382 ms\par \par QTC Calculation(Bazett) 387 ms\par \par P Axis -1 degrees\par \par R Axis 48 degrees\par \par T Axis 32 degrees\par \par Diagnosis Line Normal sinus rhythm\par Normal ECG\par \par Confirmed by HARMONY FINNEGAN, [facility] ([id number]) on 8/14/2022 9:57:35 AM\par \par cbc and cmp noted